# Patient Record
Sex: MALE | Race: WHITE | Employment: FULL TIME | URBAN - METROPOLITAN AREA
[De-identification: names, ages, dates, MRNs, and addresses within clinical notes are randomized per-mention and may not be internally consistent; named-entity substitution may affect disease eponyms.]

---

## 2021-02-20 ENCOUNTER — HOSPITAL ENCOUNTER (EMERGENCY)
Age: 66
Discharge: HOME | End: 2021-02-20
Attending: EMERGENCY MEDICINE
Payer: COMMERCIAL

## 2021-02-20 VITALS
HEIGHT: 73 IN | OXYGEN SATURATION: 95 % | HEART RATE: 74 BPM | RESPIRATION RATE: 16 BRPM | DIASTOLIC BLOOD PRESSURE: 72 MMHG | BODY MASS INDEX: 24.78 KG/M2 | WEIGHT: 186.95 LBS | SYSTOLIC BLOOD PRESSURE: 128 MMHG | TEMPERATURE: 97.2 F

## 2021-02-20 DIAGNOSIS — S61.213A LACERATION OF LEFT MIDDLE FINGER WITHOUT FOREIGN BODY WITHOUT DAMAGE TO NAIL, INITIAL ENCOUNTER: Primary | ICD-10-CM

## 2021-02-20 PROCEDURE — 75810000293 HC SIMP/SUPERF WND  RPR

## 2021-02-20 PROCEDURE — 74011000250 HC RX REV CODE- 250: Performed by: EMERGENCY MEDICINE

## 2021-02-20 PROCEDURE — 99282 EMERGENCY DEPT VISIT SF MDM: CPT

## 2021-02-20 RX ORDER — BACITRACIN 500 UNIT/G
1 PACKET (EA) TOPICAL
Status: COMPLETED | OUTPATIENT
Start: 2021-02-20 | End: 2021-02-20

## 2021-02-20 RX ORDER — ATORVASTATIN CALCIUM 10 MG/1
10 TABLET, FILM COATED ORAL DAILY
COMMUNITY

## 2021-02-20 RX ORDER — LIDOCAINE HYDROCHLORIDE 20 MG/ML
0.3 INJECTION, SOLUTION INFILTRATION; PERINEURAL ONCE
Status: COMPLETED | OUTPATIENT
Start: 2021-02-20 | End: 2021-02-20

## 2021-02-20 RX ADMIN — LIDOCAINE HYDROCHLORIDE 6 MG: 20 INJECTION, SOLUTION INFILTRATION; PERINEURAL at 12:42

## 2021-02-20 RX ADMIN — BACITRACIN 1 PACKET: 500 OINTMENT TOPICAL at 13:34

## 2021-02-20 NOTE — ED TRIAGE NOTES
Pt ambulates to treatment area he states that about an hour ago he sliced the inside of the left ring finger with a tape measure and the bleeding will not stop.   Pt's last tetanus was 6 yrs ago

## 2021-02-20 NOTE — DISCHARGE INSTRUCTIONS
Absorbable sutures placed. No removal necessary. Should resorb in 3 to 5 days. Keep wound clean and dry.   Monitor carefully for signs of infection (redness, swelling, warm to touch, pus draining from wound, severe pain.)

## 2021-02-20 NOTE — ED PROVIDER NOTES
The history is provided by the patient. Laceration   The incident occurred 1 to 2 hours ago. Pain location: left middle finger. The laceration is 2 cm in size. The injury mechanism is a metal edge. Foreign body present: no. The pain is mild. The patient's last tetanus shot was 5 to 10 years ago. Patient was using a tape measure when the edge sliced his left middle finger in the area of the middle phalanx, palmar surface. Patient states he was not initially aware that he had been cut until he saw blood on his pants. He reports having some trouble getting the bleeding under control. .    Past Medical History:   Diagnosis Date    Arthritis     High cholesterol        Past Surgical History:   Procedure Laterality Date    HX APPENDECTOMY           History reviewed. No pertinent family history.     Social History     Socioeconomic History    Marital status:      Spouse name: Not on file    Number of children: Not on file    Years of education: Not on file    Highest education level: Not on file   Occupational History    Not on file   Social Needs    Financial resource strain: Not on file    Food insecurity     Worry: Not on file     Inability: Not on file    Transportation needs     Medical: Not on file     Non-medical: Not on file   Tobacco Use    Smoking status: Former Smoker    Smokeless tobacco: Never Used   Substance and Sexual Activity    Alcohol use: Yes     Comment: weekends    Drug use: Never    Sexual activity: Not on file   Lifestyle    Physical activity     Days per week: Not on file     Minutes per session: Not on file    Stress: Not on file   Relationships    Social connections     Talks on phone: Not on file     Gets together: Not on file     Attends Yazidi service: Not on file     Active member of club or organization: Not on file     Attends meetings of clubs or organizations: Not on file     Relationship status: Not on file    Intimate partner violence     Fear of current or ex partner: Not on file     Emotionally abused: Not on file     Physically abused: Not on file     Forced sexual activity: Not on file   Other Topics Concern    Not on file   Social History Narrative    Not on file         ALLERGIES: Patient has no known allergies. Review of Systems   Skin: Positive for wound. Finger Laceration   All other systems reviewed and are negative. Vitals:    02/20/21 1218   BP: 128/72   Pulse: 74   Resp: 16   Temp: 97.2 °F (36.2 °C)   SpO2: 95%   Weight: 84.8 kg (186 lb 15.2 oz)   Height: 6' 1\" (1.854 m)            Physical Exam  Vitals signs and nursing note reviewed. Constitutional:       Appearance: Normal appearance. HENT:      Head: Normocephalic and atraumatic. Eyes:      Extraocular Movements: Extraocular movements intact. Neck:      Musculoskeletal: Normal range of motion. Pulmonary:      Effort: Pulmonary effort is normal.   Musculoskeletal: Normal range of motion. Hands:    Neurological:      General: No focal deficit present. Mental Status: He is alert and oriented to person, place, and time. Psychiatric:         Mood and Affect: Mood normal.         Behavior: Behavior normal.         Thought Content:  Thought content normal.         Judgment: Judgment normal.          2cm laceration  MDM  Number of Diagnoses or Management Options  Laceration of left middle finger without foreign body without damage to nail, initial encounter: new and does not require workup  Risk of Complications, Morbidity, and/or Mortality  Presenting problems: moderate  Diagnostic procedures: moderate  Management options: moderate    Patient Progress  Patient progress: stable         Wound Repair    Date/Time: 2/21/2021 7:04 AM  Performed by: attendingPreparation: skin prepped with Mor-Clens  Location details: left long finger  Wound length:2.5 cm or less  Anesthesia: local infiltration    Anesthesia:  Local Anesthetic: lidocaine 2% without epinephrine  Foreign bodies: no foreign bodies  Irrigation solution: saline  Debridement: minimal  Skin closure: Vicryl  Technique: running  Approximation: close  Dressing: antibiotic ointment, 4x4 and pressure dressing  My total time at bedside, performing this procedure was 16-30 minutes. 2cm laceration repair. 4-0 vicryl, running suture. No complications    LABORATORY TESTS: N/A    IMAGING RESULTS: N/A    MEDICATIONS GIVEN:  Medications   lidocaine (XYLOCAINE) 20 mg/mL (2 %) injection 6 mg (6 mg IntraDERMal Given by Provider 2/20/21 1922)   bacitracin 500 unit/gram packet 1 Packet (1 Packet Topical Given 2/20/21 8432)       IMPRESSION:  1. Laceration of left middle finger without foreign body without damage to nail, initial encounter        PLAN:  1. Discharge to Home  2.  Return to ED if worse

## 2021-02-20 NOTE — ED NOTES
Bacitracin, splint and bulky dressing applied to left ring finger pt tolerates well.   Pt given discharge instructions by Dr Audrey Pascual he verbalizes an understanding

## 2021-09-08 ENCOUNTER — TRANSCRIBE ORDER (OUTPATIENT)
Dept: SCHEDULING | Age: 66
End: 2021-09-08

## 2021-09-08 DIAGNOSIS — C61 PROSTATE CANCER (HCC): Primary | ICD-10-CM

## 2021-09-17 ENCOUNTER — HOSPITAL ENCOUNTER (OUTPATIENT)
Dept: RADIATION THERAPY | Age: 66
Discharge: HOME | End: 2021-09-17

## 2021-09-17 VITALS — BODY MASS INDEX: 23.79 KG/M2 | WEIGHT: 179.5 LBS | HEIGHT: 73 IN

## 2021-09-17 RX ORDER — GUAIFENESIN 100 MG/5ML
81 LIQUID (ML) ORAL DAILY
COMMUNITY

## 2021-11-12 NOTE — PERIOP NOTES
Informed patient of COVID requirements, patient to complete COVID curbside testing at [facility] complete by Wednesday, November 17th between 1179-2637. Patient verbalized understanding that COVID test is required to proceed with procedure.

## 2021-11-16 NOTE — H&P
73 y.o. male for open access colonoscopy for screening   Additional data for completion of the targeted pre-endoscopy H&P will be provided under 'H&P interval notes'. Please see that document which will be attached to this.   Carson Bahena MD

## 2021-11-17 ENCOUNTER — HOSPITAL ENCOUNTER (OUTPATIENT)
Dept: PREADMISSION TESTING | Age: 66
Discharge: HOME | End: 2021-11-17
Attending: SPECIALIST
Payer: COMMERCIAL

## 2021-11-17 ENCOUNTER — TRANSCRIBE ORDER (OUTPATIENT)
Dept: REGISTRATION | Age: 66
End: 2021-11-17

## 2021-11-17 DIAGNOSIS — Z01.812 PRE-PROCEDURE LAB EXAM: ICD-10-CM

## 2021-11-17 DIAGNOSIS — Z01.812 PRE-PROCEDURE LAB EXAM: Primary | ICD-10-CM

## 2021-11-17 PROCEDURE — U0005 INFEC AGEN DETEC AMPLI PROBE: HCPCS

## 2021-11-18 LAB
SARS-COV-2, XPLCVT: NOT DETECTED
SOURCE, COVRS: NORMAL

## 2021-11-18 RX ORDER — PREDNISONE 5 MG/1
5 TABLET ORAL DAILY
COMMUNITY

## 2021-11-18 RX ORDER — GLUCOSAMINE SULFATE 1500 MG
1000 POWDER IN PACKET (EA) ORAL DAILY
COMMUNITY

## 2021-11-18 RX ORDER — ABIRATERONE 500 MG/1
1000 TABLET ORAL
COMMUNITY

## 2021-11-18 NOTE — PROGRESS NOTES
[address]  Endoscopy Preprocedure Instructions      1. On the day of your surgery, please report to registration located on the 2nd floor of the  [facility]. yes    2. You must have a responsible adult to drive you to the hospital, stay at the hospital during your procedure and drive you home. If they leave your procedure will not be started (no exceptions). yes    3. Do not have anything to eat or drink (including water, gum, mints, coffee, and juice) after midnight. This does not apply to the medications you were instructed to take by your physician. yes  If you are currently taking Plavix, Coumadin, Aspirin, or other blood-thinning agents, contact your physician for special instructions. not applicable,    4. If you are having a procedure that requires bowel prep: We recommend that you have only clear liquids the day before your procedure and begin your bowel prep by 5:00 pm.  You may continue to drink clear liquids until midnight. If for any reason you are not able to complete your prep please notify your physician immediately. yes    5. Have a list of all current medications, including vitamins, herbal supplements and any other over the counter medications. yes    6. If you wear glasses, contacts, dentures and/or hearing aids, they may be removed prior to procedure, please bring a case to store them in. yes    7. You should understand that if you do not follow these instructions your procedure may be cancelled. If your physical condition changes (I.e. fever, cold or flu) please contact your doctor as soon as possible. 8. It is important that you be on time. If for any reason you are unable to keep your appointment please call [phone] the day of or your physicians office prior to your scheduled procedure    9.  Have you received your COVID Vaccine? yes If no, you will need to receive a COVID test/swab here at [facility] the MOB parking lot Monday - Friday 8a - 11am. There are no Saturday or Sunday swabbing at any [address] [place] [address] facility. (patient verbalizes understanding) yes

## 2021-11-19 ENCOUNTER — HOSPITAL ENCOUNTER (OUTPATIENT)
Age: 66
Setting detail: OUTPATIENT SURGERY
Discharge: HOME | End: 2021-11-19
Attending: SPECIALIST | Admitting: SPECIALIST
Payer: COMMERCIAL

## 2021-11-19 ENCOUNTER — ANESTHESIA (OUTPATIENT)
Dept: ENDOSCOPY | Age: 66
End: 2021-11-19
Payer: COMMERCIAL

## 2021-11-19 ENCOUNTER — ANESTHESIA EVENT (OUTPATIENT)
Dept: ENDOSCOPY | Age: 66
End: 2021-11-19
Payer: COMMERCIAL

## 2021-11-19 VITALS
BODY MASS INDEX: 23.92 KG/M2 | WEIGHT: 176.59 LBS | TEMPERATURE: 97.6 F | HEART RATE: 53 BPM | RESPIRATION RATE: 16 BRPM | DIASTOLIC BLOOD PRESSURE: 75 MMHG | SYSTOLIC BLOOD PRESSURE: 116 MMHG | HEIGHT: 72 IN | OXYGEN SATURATION: 98 %

## 2021-11-19 PROCEDURE — 76060000031 HC ANESTHESIA FIRST 0.5 HR: Performed by: SPECIALIST

## 2021-11-19 PROCEDURE — 74011250636 HC RX REV CODE- 250/636: Performed by: NURSE ANESTHETIST, CERTIFIED REGISTERED

## 2021-11-19 PROCEDURE — 2709999900 HC NON-CHARGEABLE SUPPLY: Performed by: SPECIALIST

## 2021-11-19 PROCEDURE — 76040000019: Performed by: SPECIALIST

## 2021-11-19 PROCEDURE — 77030013992 HC SNR POLYP ENDOSC BSC -B: Performed by: SPECIALIST

## 2021-11-19 PROCEDURE — 88305 TISSUE EXAM BY PATHOLOGIST: CPT

## 2021-11-19 RX ORDER — DEXTROMETHORPHAN/PSEUDOEPHED 2.5-7.5/.8
1.2 DROPS ORAL
Status: DISCONTINUED | OUTPATIENT
Start: 2021-11-19 | End: 2021-11-19 | Stop reason: HOSPADM

## 2021-11-19 RX ORDER — FLUMAZENIL 0.1 MG/ML
0.2 INJECTION INTRAVENOUS
Status: DISCONTINUED | OUTPATIENT
Start: 2021-11-19 | End: 2021-11-19 | Stop reason: HOSPADM

## 2021-11-19 RX ORDER — PROPOFOL 10 MG/ML
INJECTION, EMULSION INTRAVENOUS AS NEEDED
Status: DISCONTINUED | OUTPATIENT
Start: 2021-11-19 | End: 2021-11-19 | Stop reason: HOSPADM

## 2021-11-19 RX ORDER — SODIUM CHLORIDE 9 MG/ML
INJECTION, SOLUTION INTRAVENOUS
Status: DISCONTINUED | OUTPATIENT
Start: 2021-11-19 | End: 2021-11-19 | Stop reason: HOSPADM

## 2021-11-19 RX ORDER — PROPOFOL 10 MG/ML
INJECTION, EMULSION INTRAVENOUS
Status: DISCONTINUED | OUTPATIENT
Start: 2021-11-19 | End: 2021-11-19 | Stop reason: HOSPADM

## 2021-11-19 RX ORDER — NALOXONE HYDROCHLORIDE 0.4 MG/ML
0.4 INJECTION, SOLUTION INTRAMUSCULAR; INTRAVENOUS; SUBCUTANEOUS
Status: DISCONTINUED | OUTPATIENT
Start: 2021-11-19 | End: 2021-11-19 | Stop reason: HOSPADM

## 2021-11-19 RX ORDER — MIDAZOLAM HYDROCHLORIDE 1 MG/ML
.25-5 INJECTION, SOLUTION INTRAMUSCULAR; INTRAVENOUS AS NEEDED
Status: DISCONTINUED | OUTPATIENT
Start: 2021-11-19 | End: 2021-11-19 | Stop reason: HOSPADM

## 2021-11-19 RX ORDER — SODIUM CHLORIDE 9 MG/ML
50 INJECTION, SOLUTION INTRAVENOUS CONTINUOUS
Status: DISCONTINUED | OUTPATIENT
Start: 2021-11-19 | End: 2021-11-19 | Stop reason: HOSPADM

## 2021-11-19 RX ORDER — FENTANYL CITRATE 50 UG/ML
25 INJECTION, SOLUTION INTRAMUSCULAR; INTRAVENOUS AS NEEDED
Status: DISCONTINUED | OUTPATIENT
Start: 2021-11-19 | End: 2021-11-19 | Stop reason: HOSPADM

## 2021-11-19 RX ADMIN — SODIUM CHLORIDE: 9 INJECTION, SOLUTION INTRAVENOUS at 07:55

## 2021-11-19 RX ADMIN — PROPOFOL INJECTABLE EMULSION 140 MCG/KG/MIN: 10 INJECTION, EMULSION INTRAVENOUS at 08:15

## 2021-11-19 RX ADMIN — PROPOFOL 80 MG: 10 INJECTION, EMULSION INTRAVENOUS at 08:15

## 2021-11-19 RX ADMIN — PROPOFOL 40 MG: 10 INJECTION, EMULSION INTRAVENOUS at 08:21

## 2021-11-19 NOTE — DISCHARGE INSTRUCTIONS
[address] [facility] WALDEMAR Rubio MD  [phone]      November 19, 2021    Vincent Abraham  YOB: 1955    COLONOSCOPY DISCHARGE INSTRUCTIONS    If there is redness at IV site you should apply warm compress to area. If redness or soreness persist contact Dr. Didi Rubio' or your primary care doctor. There may be a slight amount of blood passed from the rectum. Gaseous discomfort may develop, but walking, belching will help relieve this. You may not operate a vehicle for 12 hours  You may not operate machinery or dangerous appliances for rest of today  You may not drink alcoholic beverages for 12 hours  Avoid making any critical decisions for 24 hours    DIET:  You may resume your normal diet, but some patients find that heavy or large meals may lead to indigestion or vomiting. I suggest a light meal as first food intake. MEDICATIONS:  The use of some over-the-counter pain medication may lead to bleeding after colon biopsies or polyp removal.  Tylenol (also called acetaminophen) is safe to take even if you have had colonoscopy with polyp removal.  Based on the procedure you had today you may not safely take aspirin or aspirin-like products for the next ten (10) days. Remember that Tylenol (also called acetaminophen) is safe to take after colonoscopy even if you have had biopsies or polyps removed. ACTIVITY:  You may resume your normal household activities, but it is recommended that you spend the remainder of the day resting -  avoid any strenuous activity. CALL DR. Lala Mccormick' OFFICE IF:  Increasing pain, nausea, vomiting  Abdominal distension (swelling)  Significant new or increased bleeding (oral or rectal)  Fever/Chills  Chest pain/shortness of breath                       Additional instructions: We found and removed one small polyp today; I'll contact you with those results by letter in about a week.      It was an honor to be your doctor today. Please let me or my office staff know if you have any feedback about today's procedure. Zach Jara MD    Colonoscopy saves lives, and can prevent colon cancer. Everyone aged 48 or older needs colonoscopy.   Tell your family and friends: get the test!

## 2021-11-19 NOTE — PROGRESS NOTES
Endoscopy discharge instructions have been reviewed and given to patient. The patient verbalized understanding and acceptance of instructions. Dr. Girish Mann discussed with patient procedure findings and next steps.

## 2021-11-19 NOTE — ANESTHESIA PREPROCEDURE EVALUATION
Relevant Problems   No relevant active problems       Anesthetic History   No history of anesthetic complications            Review of Systems / Medical History  Patient summary reviewed and pertinent labs reviewed    Pulmonary  Within defined limits                 Neuro/Psych   Within defined limits           Cardiovascular              Hyperlipidemia    Exercise tolerance: >4 METS     GI/Hepatic/Renal  Within defined limits              Endo/Other        Arthritis and cancer (metastatic prosate cancer recently diagnosed)     Other Findings              Physical Exam    Airway  Mallampati: II  TM Distance: 4 - 6 cm  Neck ROM: normal range of motion   Mouth opening: Normal     Cardiovascular    Rhythm: regular  Rate: normal         Dental    Dentition: Upper dentition intact and Lower dentition intact     Pulmonary  Breath sounds clear to auscultation               Abdominal         Other Findings            Anesthetic Plan    ASA: 3  Anesthesia type: MAC          Induction: Intravenous  Anesthetic plan and risks discussed with: Patient

## 2021-11-19 NOTE — PROGRESS NOTES
Ag Rea  1955  [id number]    Situation:  Verbal report received from:  Lynette Armstrong RN   Procedure: Procedure(s):  COLONOSCOPY  ENDOSCOPIC POLYPECTOMY    Background:    Preoperative diagnosis: Screening for malignant neoplasm of colon [Z12.11]  Postoperative diagnosis: 1.- Colonic Polyp  2.- Hemorrhoids    :  Dr. Gema Hernandez   Assistant(s): Endoscopy Technician-1: Gena Clements  Endoscopy RN-1: Ashanti Ruano RN    Specimens:   ID Type Source Tests Collected by Time Destination   1 : Ascending Colon Polyp Preservative   Violetta Brown MD 11/19/2021 9488 Pathology     H. Pylori  no    Assessment:  Intra-procedure medications     Anesthesia gave intra-procedure sedation and medications, see anesthesia flow sheet yes    Intravenous fluids: NS@ KVO     Vital signs stable  yes    Abdominal assessment: round and soft   yes    Recommendation:  Discharge patient per MD order  yes.   Return to floor outpatient  Family or Friend  Spouse   Permission to share finding with family or friend yes

## 2021-11-19 NOTE — INTERVAL H&P NOTE
Pre-Endoscopy H&P Update  Chief complaint/HPI/ROS:  The indication for the procedure, the patient's history and the patient's current medications are reviewed prior to the procedure and that data is reported on the H&P to which this document is attached. Any significant complaints with regard to organ systems will be noted, and if not mentioned then a review of systems is not contributory. Past Medical History:   Diagnosis Date    Arthritis     Cancer ([facility] [address] 06/2021    metistatic prostate cancer, mets to spine    High cholesterol       Past Surgical History:   Procedure Laterality Date    HX APPENDECTOMY       Social   Social History     Tobacco Use    Smoking status: Former Smoker    Smokeless tobacco: Never Used   Substance Use Topics    Alcohol use: Yes     Comment: weekends      History reviewed. No pertinent family history. No Known Allergies   Prior to Admission Medications   Prescriptions Last Dose Informant Patient Reported? Taking?   abiraterone 500 mg tab 11/18/2021 at Unknown time  Yes Yes   Sig: Take 1,000 mg by mouth. aspirin 81 mg chewable tablet Not Taking at Unknown time  Yes No   Sig: Take 81 mg by mouth daily. Patient not taking: Reported on 11/18/2021   atorvastatin (Lipitor) 10 mg tablet 11/18/2021 at Unknown time  Yes Yes   Sig: Take 10 mg by mouth daily. calcium carbonate (CALCIUM 600 PO) 11/18/2021 at Unknown time  Yes Yes   Sig: Take 600 mg by mouth. cholecalciferol (Vitamin D3) 25 mcg (1,000 unit) cap 11/18/2021 at Unknown time  Yes Yes   Sig: Take 1,000 Units by mouth daily. predniSONE (DELTASONE) 5 mg tablet 11/18/2021 at Unknown time  Yes Yes   Sig: Take 5 mg by mouth daily. Facility-Administered Medications: None       PHYSICAL EXAM:  The patient is examined immediately prior to the procedure.   Visit Vitals  BP (!) 146/76   Pulse (!) 54   Temp 97.9 °F (36.6 °C)   Resp 15   Ht 6' (1.829 m)   Wt 80.1 kg (176 lb 9.4 oz)   SpO2 99%   BMI 23.95 kg/m²     Gen: Appears comfortable, no distress. Pulm: comfortable respirations with no abnormal audible breath sounds  HEART: well perfused, no abnormal audible heart sounds  GI: abdomen flat. PLAN:  Informed consent discussion held, patient afforded an opportunity to ask questions and all questions answered. After being advised of the risks, benefits, and alternatives, the patient requested that we proceed and indicated so on a written consent form. Will proceed with procedure as planned.   Jerica Burgos MD

## 2021-11-19 NOTE — PROGRESS NOTES

## 2021-11-19 NOTE — PROCEDURES
[address] [facility] WALDEMAR Dhaliwal MD  [phone]      2021    Colonoscopy Procedure Note  Mango Li  :  1955  [place] Medical Record Number: [id number]    Indications:     Screening colonoscopy  PCP:  Gia Hodges MD  Anesthesia/Sedation: Conscious Sedation/Moderate Sedation/GETA, see notes  Endoscopist:  Dr. Nigel Woodard  Complications:  None  Estimated Blood Loss:  None    Permit:  The indications, risks, benefits and alternatives were reviewed with the patient or their decision maker who was provided an opportunity to ask questions and all questions were answered. The specific risks of colonoscopy with conscious sedation were reviewed, including but not limited to anesthetic complication, bleeding, adverse drug reaction, missed lesion, infection, IV site reactions, and intestinal perforation which would lead to the need for surgical repair. Alternatives to colonoscopy including radiographic imaging, observation without testing, or laboratory testing were reviewed including the limitations of those alternatives. After considering the options and having all their questions answered, the patient or their decision maker provided both verbal and written consent to proceed. Procedure in Detail:  After obtaining informed consent, positioning of the patient in the left lateral decubitus position, and conduction of a pre-procedure pause or \"time out\" the endoscope was introduced into the anus and advanced to the cecum, which was identified by the ileocecal valve and appendiceal orifice. The quality of the colonic preparation was good. A careful inspection was made as the colonoscope was withdrawn, findings and interventions are described below. Findings:   5mm polyp ascending removed cold snare, retrieved, hemostasis confirmed.   There is diverticulosis in the sigmoid colon without complications such as bleeding, inflammatory change, or luminal narrowing. Specimens:    See above    Complications:   None; patient tolerated the procedure well. Impression:  Colon polyp, hemorrhoids    Recommendations:     - Await pathology. Thank you for entrusting me with this patient's care. Please do not hesitate to contact me with any questions or if I can be of assistance with any of your other patients' GI needs. Signed By: Hellen Mcnair MD                        November 19, 2021      Surgical assistant none. Implants none unless specified.

## 2023-05-04 ENCOUNTER — TRANSCRIBE ORDER (OUTPATIENT)
Dept: SCHEDULING | Age: 68
End: 2023-05-04

## 2023-05-04 DIAGNOSIS — M17.0 PRIMARY OSTEOARTHRITIS OF BOTH KNEES: Primary | ICD-10-CM

## 2023-05-10 ENCOUNTER — HOSPITAL ENCOUNTER (OUTPATIENT)
Facility: HOSPITAL | Age: 68
Discharge: HOME | End: 2023-05-13
Payer: MEDICARE

## 2023-05-10 DIAGNOSIS — M17.0 PRIMARY OSTEOARTHRITIS OF BOTH KNEES: ICD-10-CM

## 2023-05-10 PROCEDURE — 73700 CT LOWER EXTREMITY W/O DYE: CPT

## 2023-05-10 RX ORDER — ASPIRIN 81 MG/1
81 TABLET, CHEWABLE ORAL DAILY
Status: ON HOLD | COMMUNITY
End: 2023-05-18 | Stop reason: HOSPADM

## 2023-05-10 RX ORDER — ABIRATERONE ACETATE 250 MG/1
750 TABLET ORAL EVERY MORNING
COMMUNITY

## 2023-05-10 RX ORDER — ATORVASTATIN CALCIUM 20 MG/1
20 TABLET, FILM COATED ORAL
COMMUNITY

## 2023-05-10 RX ORDER — PREDNISONE 5 MG/1
5 TABLET ORAL EVERY MORNING
COMMUNITY

## 2023-05-11 ENCOUNTER — HOSPITAL ENCOUNTER (OUTPATIENT)
Facility: HOSPITAL | Age: 68
Discharge: HOME | End: 2023-05-11
Payer: MEDICARE

## 2023-05-11 VITALS
RESPIRATION RATE: 18 BRPM | SYSTOLIC BLOOD PRESSURE: 150 MMHG | BODY MASS INDEX: 23.84 KG/M2 | HEART RATE: 60 BPM | OXYGEN SATURATION: 98 % | TEMPERATURE: 97.1 F | HEIGHT: 72 IN | WEIGHT: 176 LBS | DIASTOLIC BLOOD PRESSURE: 88 MMHG

## 2023-05-11 LAB
ALBUMIN SERPL-MCNC: 3.7 G/DL (ref 3.5–5)
ALBUMIN/GLOB SERPL: 1.2 (ref 1.1–2.2)
ALP SERPL-CCNC: 80 U/L (ref 45–117)
ALT SERPL-CCNC: 31 U/L (ref 12–78)
ANION GAP SERPL CALC-SCNC: 2 MMOL/L (ref 5–15)
APPEARANCE UR: CLEAR
AST SERPL-CCNC: 16 U/L (ref 15–37)
BACTERIA URNS QL MICRO: NEGATIVE /HPF
BASOPHILS # BLD: 0.1 K/UL (ref 0–0.1)
BASOPHILS NFR BLD: 1 % (ref 0–1)
BILIRUB SERPL-MCNC: 0.8 MG/DL (ref 0.2–1)
BILIRUB UR QL: NEGATIVE
BUN SERPL-MCNC: 21 MG/DL (ref 6–20)
BUN/CREAT SERPL: 29 (ref 12–20)
CALCIUM SERPL-MCNC: 8.9 MG/DL (ref 8.5–10.1)
CHLORIDE SERPL-SCNC: 108 MMOL/L (ref 97–108)
CO2 SERPL-SCNC: 28 MMOL/L (ref 21–32)
COLOR UR: NORMAL
CREAT SERPL-MCNC: 0.73 MG/DL (ref 0.7–1.3)
CRP SERPL-MCNC: <0.29 MG/DL (ref 0–0.6)
DIFFERENTIAL METHOD BLD: ABNORMAL
EOSINOPHIL # BLD: 0.1 K/UL (ref 0–0.4)
EOSINOPHIL NFR BLD: 1 % (ref 0–7)
EPITH CASTS URNS QL MICRO: NORMAL /LPF
ERYTHROCYTE [DISTWIDTH] IN BLOOD BY AUTOMATED COUNT: 12.1 % (ref 11.5–14.5)
ERYTHROCYTE [SEDIMENTATION RATE] IN BLOOD: 7 MM/HR (ref 0–20)
EST. AVERAGE GLUCOSE BLD GHB EST-MCNC: 114 MG/DL
GLOBULIN SER CALC-MCNC: 3 G/DL (ref 2–4)
GLUCOSE SERPL-MCNC: 88 MG/DL (ref 65–100)
GLUCOSE UR STRIP.AUTO-MCNC: NEGATIVE MG/DL
HBA1C MFR BLD: 5.6 % (ref 4–5.6)
HCT VFR BLD AUTO: 42.1 % (ref 36.6–50.3)
HGB BLD-MCNC: 13.8 G/DL (ref 12.1–17)
HGB UR QL STRIP: NEGATIVE
HYALINE CASTS URNS QL MICRO: NORMAL /LPF (ref 0–2)
IMM GRANULOCYTES # BLD AUTO: 0 K/UL (ref 0–0.04)
IMM GRANULOCYTES NFR BLD AUTO: 0 % (ref 0–0.5)
KETONES UR QL STRIP.AUTO: NEGATIVE MG/DL
LEUKOCYTE ESTERASE UR QL STRIP.AUTO: NEGATIVE
LYMPHOCYTES # BLD: 0.6 K/UL (ref 0.8–3.5)
LYMPHOCYTES NFR BLD: 9 % (ref 12–49)
MCH RBC QN AUTO: 31.1 PG (ref 26–34)
MCHC RBC AUTO-ENTMCNC: 32.8 G/DL (ref 30–36.5)
MCV RBC AUTO: 94.8 FL (ref 80–99)
MONOCYTES # BLD: 0.4 K/UL (ref 0–1)
MONOCYTES NFR BLD: 6 % (ref 5–13)
NEUTS SEG # BLD: 5 K/UL (ref 1.8–8)
NEUTS SEG NFR BLD: 83 % (ref 32–75)
NITRITE UR QL STRIP.AUTO: NEGATIVE
NRBC # BLD: 0 K/UL (ref 0–0.01)
NRBC BLD-RTO: 0 PER 100 WBC
PH UR STRIP: 5.5 (ref 5–8)
PLATELET # BLD AUTO: 225 K/UL (ref 150–400)
PMV BLD AUTO: 10.3 FL (ref 8.9–12.9)
POTASSIUM SERPL-SCNC: 4.1 MMOL/L (ref 3.5–5.1)
PROT SERPL-MCNC: 6.7 G/DL (ref 6.4–8.2)
PROT UR STRIP-MCNC: NEGATIVE MG/DL
RBC # BLD AUTO: 4.44 M/UL (ref 4.1–5.7)
RBC #/AREA URNS HPF: NORMAL /HPF (ref 0–5)
RBC MORPH BLD: ABNORMAL
SODIUM SERPL-SCNC: 138 MMOL/L (ref 136–145)
SP GR UR REFRACTOMETRY: 1.02 (ref 1–1.03)
URINE CULTURE IF INDICATED: NORMAL
UROBILINOGEN UR QL STRIP.AUTO: 0.2 EU/DL (ref 0.2–1)
WBC # BLD AUTO: 6.2 K/UL (ref 4.1–11.1)
WBC URNS QL MICRO: NORMAL /HPF (ref 0–4)

## 2023-05-11 PROCEDURE — 81001 URINALYSIS AUTO W/SCOPE: CPT

## 2023-05-11 PROCEDURE — 36415 COLL VENOUS BLD VENIPUNCTURE: CPT

## 2023-05-11 PROCEDURE — 84466 ASSAY OF TRANSFERRIN: CPT

## 2023-05-11 PROCEDURE — 80053 COMPREHEN METABOLIC PANEL: CPT

## 2023-05-11 PROCEDURE — 85652 RBC SED RATE AUTOMATED: CPT

## 2023-05-11 PROCEDURE — 85025 COMPLETE CBC W/AUTO DIFF WBC: CPT

## 2023-05-11 PROCEDURE — 86140 C-REACTIVE PROTEIN: CPT

## 2023-05-11 PROCEDURE — 83036 HEMOGLOBIN GLYCOSYLATED A1C: CPT

## 2023-05-11 ASSESSMENT — ENCOUNTER SYMPTOMS
SHORTNESS OF BREATH: 0
VOMITING: 0
SORE THROAT: 0
BLOOD IN STOOL: 0
COUGH: 0
TROUBLE SWALLOWING: 0
ABDOMINAL PAIN: 0
WHEEZING: 0
NAUSEA: 0

## 2023-05-11 ASSESSMENT — PAIN SCALES - GENERAL: PAINLEVEL_OUTOF10: 6

## 2023-05-11 ASSESSMENT — PAIN DESCRIPTION - ORIENTATION: ORIENTATION: RIGHT

## 2023-05-11 ASSESSMENT — PAIN DESCRIPTION - LOCATION: LOCATION: KNEE

## 2023-05-11 NOTE — H&P
Abdirizak Camejo was referred for evaluation by:Dr. Mortimer  for Pre- Op Evaluation. Please see encounter details and orders for consultative summary. Type of surgery : Left Total Knee Arthroplasty  Surgery site : Left knee  Anesthesia type: Spinal  Date of procedure:  5/18/2023    This 78y.o. year old male presents with complaints of left knee pain for the past several years. Has had arthroscopic surgery to both knees 10+ years ago that gave him significant relief. Conservative measures including medication management, joint injection and physical therapy have been exhausted prior to the decision for surgery. Patient has discussed the risks, alternatives, and benefits of the surgery with surgeon and has elected to proceed with surgical intervention. History of Prostate cancer with mets to spine. He is managed by Oncologist at [facility] Dr Merlene Davis. Reports clearance form was sent to Oncologist from Dr Keri Barnes office for upcoming surgery. Allergies: No Known Allergies  Latex allergy: no  Prior reactions to anesthesia:  None     Current Outpatient Medications   Medication Sig    Leuprolide Acetate, 3 Month, (ELIGARD) 22.5 MG KIT Inject 22.5 mg into the skin every 3 months    Glucosamine-Chondroitin-MSM (GLUCOSAMINE CHONDROIT MSM DS PO) Take 2 tablets by mouth every morning 1500mg-1200mg    Calcium-Magnesium-Vitamin D (CITRACAL CALCIUM+D PO) Take 2 tablets by mouth in the morning and at bedtime 650 mg    abiraterone acetate (ZYTIGA) 250 MG tablet Take 3 tablets by mouth every morning    aspirin 81 MG chewable tablet Take 1 tablet by mouth daily    atorvastatin (LIPITOR) 20 MG tablet Take 1 tablet by mouth nightly    Cholecalciferol 50 MCG (2000 UT) TABS Take 2 tablets by mouth every morning    predniSONE (DELTASONE) 5 MG tablet Take 1 tablet by mouth every morning     No current facility-administered medications for this encounter.      Past Medical History:   Diagnosis Date    Arthritis     Cancer ([address]

## 2023-05-12 LAB
BACTERIA SPEC CULT: NORMAL
BACTERIA SPEC CULT: NORMAL
SERVICE CMNT-IMP: NORMAL
TRANSFERRIN SERPL-MCNC: 248 MG/DL (ref 177–329)

## 2023-05-12 NOTE — PERIOP NOTE
Patient instructed to take home blood pressures today, this evening and tomorrow AM and call PAT at [phone] with results per Filipe Camarena NP.
Spoke with patient and documented home BP readings  over last 2 days 111/62, 112/63/, 125/66. Patient had questions about lab results discussed his concerns and instructed that Francisco Brock NP would look at pending labs and would notify him if there was any concern.
morning of surgery, please do not apply anything to your skin (lotions, powders, deodorant, or makeup, especially mascara)  Follow Chlorhexidine Care Fusion body wash instructions provided to you during PAT appointment. Begin 3 days prior to surgery. Do not shave or trim anywhere 24 hours before surgery  Wear your hair loose or down; no pony-tails, buns, or metal hair clips  Wear loose, comfortable, clean clothes  Wear glasses instead of contacts. Bring a case to keep your glasses safe. Leave money, valuables, and jewelry, including body piercings, at home  If you were given an IGIGI, bring it on day of surgery. Going Home - or Spending the Night SAME-DAY SURGERY: You must have a responsible adult drive you home and stay with you 24 hours after surgery. You may not drive for 24 hours after surgery. ADMITS: If your doctor is keeping you in the hospital after surgery, leave personal belongings/luggage in your car until you have a hospital room number. Hospital discharge time is 12 noon  Drivers must be here before 12 noon unless you are told differently   Special Instructions Free  parking available from 7 PM until 5 PM.       Follow all instructions so your surgery wont be cancelled. Please, be on time. If a situation occurs and you are delayed the day of surgery, call [phone]. If your physical condition changes (like a fever, cold, flu, etc.) call your surgeon. Home medication(s) reviewed and verified via LIST and VERBAL   during PAT appointment. The patient was contacted  IN-PERSON. The patient verbalizes understanding of all instructions and DOES NOT need reinforcement.

## 2023-05-13 LAB
EKG ATRIAL RATE: 57 BPM
EKG DIAGNOSIS: NORMAL
EKG P AXIS: 62 DEGREES
EKG P-R INTERVAL: 102 MS
EKG Q-T INTERVAL: 444 MS
EKG QRS DURATION: 78 MS
EKG QTC CALCULATION (BAZETT): 432 MS
EKG R AXIS: 53 DEGREES
EKG T AXIS: 52 DEGREES
EKG VENTRICULAR RATE: 57 BPM

## 2023-05-17 ENCOUNTER — ANESTHESIA EVENT (OUTPATIENT)
Facility: HOSPITAL | Age: 68
End: 2023-05-17
Payer: MEDICARE

## 2023-05-17 RX ORDER — ASPIRIN 81 MG/1
81 TABLET ORAL 2 TIMES DAILY
Status: CANCELLED | OUTPATIENT
Start: 2023-05-17

## 2023-05-17 NOTE — PROGRESS NOTES
The patient was provided a virtual link to view the pre-operative Joint Replacement Class Video  A Patient Education Book specific to total hip or knee joint replacement surgery was given to the patient in [facility]. The content of the class was presented using an audio power point presentation specific for patients undergoing total hip and knee replacement surgery. Incentive spirometer and CHG bath kits were verbally reviewed. Day of surgery routine and expectations, hospital routine and expectations, nutrition, alcohol, nicotine, medications, infection control, pain management, DVT precautions and equipment, ice therapy, durable medical equipment, exercises, mobility expectations and precautions, home preparation and safety were reviewed in class video. My contact information was shared with the patient to provide further information as requested by the patient related to their upcoming surgery. Received confirmation that the patient viewed joint class online via the [employer] pre-op education video survey.

## 2023-05-18 ENCOUNTER — APPOINTMENT (OUTPATIENT)
Facility: HOSPITAL | Age: 68
End: 2023-05-18
Attending: ORTHOPAEDIC SURGERY
Payer: MEDICARE

## 2023-05-18 ENCOUNTER — ANESTHESIA (OUTPATIENT)
Facility: HOSPITAL | Age: 68
End: 2023-05-18
Payer: MEDICARE

## 2023-05-18 ENCOUNTER — HOSPITAL ENCOUNTER (OUTPATIENT)
Facility: HOSPITAL | Age: 68
Setting detail: OBSERVATION
Discharge: HOME | End: 2023-05-19
Attending: ORTHOPAEDIC SURGERY | Admitting: ORTHOPAEDIC SURGERY
Payer: MEDICARE

## 2023-05-18 DIAGNOSIS — M17.12 ARTHRITIS OF LEFT KNEE: Primary | ICD-10-CM

## 2023-05-18 PROCEDURE — 3700000000 HC ANESTHESIA ATTENDED CARE: Performed by: ORTHOPAEDIC SURGERY

## 2023-05-18 PROCEDURE — 7100000001 HC PACU RECOVERY - ADDTL 15 MIN: Performed by: ORTHOPAEDIC SURGERY

## 2023-05-18 PROCEDURE — 6370000000 HC RX 637 (ALT 250 FOR IP): Performed by: PHYSICIAN ASSISTANT

## 2023-05-18 PROCEDURE — 2580000003 HC RX 258: Performed by: ORTHOPAEDIC SURGERY

## 2023-05-18 PROCEDURE — 2580000003 HC RX 258: Performed by: PHYSICIAN ASSISTANT

## 2023-05-18 PROCEDURE — 97161 PT EVAL LOW COMPLEX 20 MIN: CPT

## 2023-05-18 PROCEDURE — 3600000015 HC SURGERY LEVEL 5 ADDTL 15MIN: Performed by: ORTHOPAEDIC SURGERY

## 2023-05-18 PROCEDURE — 2580000003 HC RX 258: Performed by: ANESTHESIOLOGY

## 2023-05-18 PROCEDURE — 6370000000 HC RX 637 (ALT 250 FOR IP): Performed by: ANESTHESIOLOGY

## 2023-05-18 PROCEDURE — 6360000002 HC RX W HCPCS: Performed by: ANESTHESIOLOGY

## 2023-05-18 PROCEDURE — 6360000002 HC RX W HCPCS: Performed by: NURSE ANESTHETIST, CERTIFIED REGISTERED

## 2023-05-18 PROCEDURE — 6360000002 HC RX W HCPCS: Performed by: ORTHOPAEDIC SURGERY

## 2023-05-18 PROCEDURE — 6370000000 HC RX 637 (ALT 250 FOR IP): Performed by: ORTHOPAEDIC SURGERY

## 2023-05-18 PROCEDURE — 73560 X-RAY EXAM OF KNEE 1 OR 2: CPT

## 2023-05-18 PROCEDURE — G0378 HOSPITAL OBSERVATION PER HR: HCPCS

## 2023-05-18 PROCEDURE — 64447 NJX AA&/STRD FEMORAL NRV IMG: CPT | Performed by: ANESTHESIOLOGY

## 2023-05-18 PROCEDURE — 2720000010 HC SURG SUPPLY STERILE: Performed by: ORTHOPAEDIC SURGERY

## 2023-05-18 PROCEDURE — 97116 GAIT TRAINING THERAPY: CPT

## 2023-05-18 PROCEDURE — 3700000001 HC ADD 15 MINUTES (ANESTHESIA): Performed by: ORTHOPAEDIC SURGERY

## 2023-05-18 PROCEDURE — 2500000003 HC RX 250 WO HCPCS: Performed by: NURSE ANESTHETIST, CERTIFIED REGISTERED

## 2023-05-18 PROCEDURE — 7100000000 HC PACU RECOVERY - FIRST 15 MIN: Performed by: ORTHOPAEDIC SURGERY

## 2023-05-18 PROCEDURE — C1776 JOINT DEVICE (IMPLANTABLE): HCPCS | Performed by: ORTHOPAEDIC SURGERY

## 2023-05-18 PROCEDURE — 2709999900 HC NON-CHARGEABLE SUPPLY: Performed by: ORTHOPAEDIC SURGERY

## 2023-05-18 PROCEDURE — 6360000002 HC RX W HCPCS: Performed by: PHYSICIAN ASSISTANT

## 2023-05-18 PROCEDURE — 3600000005 HC SURGERY LEVEL 5 BASE: Performed by: ORTHOPAEDIC SURGERY

## 2023-05-18 DEVICE — CRUCIATE RETAINING FEMORAL
Type: IMPLANTABLE DEVICE | Site: KNEE | Status: FUNCTIONAL
Brand: TRIATHLON

## 2023-05-18 DEVICE — IMPLANTABLE DEVICE: Type: IMPLANTABLE DEVICE | Site: KNEE | Status: FUNCTIONAL

## 2023-05-18 DEVICE — PATELLA
Type: IMPLANTABLE DEVICE | Site: KNEE | Status: FUNCTIONAL
Brand: TRIATHLON

## 2023-05-18 DEVICE — TIBIAL COMPONENT
Type: IMPLANTABLE DEVICE | Site: KNEE | Status: FUNCTIONAL
Brand: TRIATHLON

## 2023-05-18 DEVICE — COMPONENT TOT KNEE CAPPED PRIMARY K2STRYKER] STRYKER CORP]: Type: IMPLANTABLE DEVICE | Status: FUNCTIONAL

## 2023-05-18 RX ORDER — PREDNISONE 5 MG/1
5 TABLET ORAL EVERY MORNING
Status: DISCONTINUED | OUTPATIENT
Start: 2023-05-19 | End: 2023-05-19 | Stop reason: HOSPADM

## 2023-05-18 RX ORDER — ATORVASTATIN CALCIUM 20 MG/1
20 TABLET, FILM COATED ORAL
Status: DISCONTINUED | OUTPATIENT
Start: 2023-05-18 | End: 2023-05-19 | Stop reason: HOSPADM

## 2023-05-18 RX ORDER — SODIUM CHLORIDE 0.9 % (FLUSH) 0.9 %
5-40 SYRINGE (ML) INJECTION EVERY 12 HOURS SCHEDULED
Status: DISCONTINUED | OUTPATIENT
Start: 2023-05-18 | End: 2023-05-19 | Stop reason: HOSPADM

## 2023-05-18 RX ORDER — PREGABALIN 75 MG/1
75 CAPSULE ORAL ONCE
Status: COMPLETED | OUTPATIENT
Start: 2023-05-18 | End: 2023-05-18

## 2023-05-18 RX ORDER — ABIRATERONE ACETATE 250 MG/1
750 TABLET ORAL EVERY MORNING
Status: DISCONTINUED | OUTPATIENT
Start: 2023-05-19 | End: 2023-05-19 | Stop reason: HOSPADM

## 2023-05-18 RX ORDER — LIDOCAINE HYDROCHLORIDE 20 MG/ML
INJECTION, SOLUTION EPIDURAL; INFILTRATION; INTRACAUDAL; PERINEURAL PRN
Status: DISCONTINUED | OUTPATIENT
Start: 2023-05-18 | End: 2023-05-18 | Stop reason: SDUPTHER

## 2023-05-18 RX ORDER — IBUPROFEN 800 MG/1
800 TABLET ORAL EVERY 6 HOURS
Qty: 60 TABLET | Refills: 0 | Status: SHIPPED | OUTPATIENT
Start: 2023-05-18

## 2023-05-18 RX ORDER — ONDANSETRON 2 MG/ML
4 INJECTION INTRAMUSCULAR; INTRAVENOUS
Status: DISCONTINUED | OUTPATIENT
Start: 2023-05-18 | End: 2023-05-18 | Stop reason: HOSPADM

## 2023-05-18 RX ORDER — SODIUM CHLORIDE, SODIUM LACTATE, POTASSIUM CHLORIDE, CALCIUM CHLORIDE 600; 310; 30; 20 MG/100ML; MG/100ML; MG/100ML; MG/100ML
INJECTION, SOLUTION INTRAVENOUS CONTINUOUS
Status: DISCONTINUED | OUTPATIENT
Start: 2023-05-18 | End: 2023-05-18 | Stop reason: HOSPADM

## 2023-05-18 RX ORDER — ACETAMINOPHEN 325 MG/1
975 TABLET ORAL ONCE
Status: COMPLETED | OUTPATIENT
Start: 2023-05-18 | End: 2023-05-18

## 2023-05-18 RX ORDER — ONDANSETRON 4 MG/1
4 TABLET, ORALLY DISINTEGRATING ORAL EVERY 8 HOURS PRN
Status: DISCONTINUED | OUTPATIENT
Start: 2023-05-18 | End: 2023-05-19 | Stop reason: HOSPADM

## 2023-05-18 RX ORDER — TRANEXAMIC ACID 100 MG/ML
INJECTION, SOLUTION INTRAVENOUS PRN
Status: DISCONTINUED | OUTPATIENT
Start: 2023-05-18 | End: 2023-05-18 | Stop reason: SDUPTHER

## 2023-05-18 RX ORDER — ASPIRIN 81 MG/1
81 TABLET ORAL 2 TIMES DAILY
Qty: 60 TABLET | Refills: 3 | Status: SHIPPED | OUTPATIENT
Start: 2023-05-18 | End: 2023-05-19 | Stop reason: HOSPADM

## 2023-05-18 RX ORDER — DEXAMETHASONE SODIUM PHOSPHATE 4 MG/ML
INJECTION, SOLUTION INTRA-ARTICULAR; INTRALESIONAL; INTRAMUSCULAR; INTRAVENOUS; SOFT TISSUE PRN
Status: DISCONTINUED | OUTPATIENT
Start: 2023-05-18 | End: 2023-05-18 | Stop reason: SDUPTHER

## 2023-05-18 RX ORDER — SODIUM CHLORIDE 9 MG/ML
INJECTION, SOLUTION INTRAVENOUS CONTINUOUS
Status: DISCONTINUED | OUTPATIENT
Start: 2023-05-18 | End: 2023-05-19 | Stop reason: HOSPADM

## 2023-05-18 RX ORDER — DIPHENHYDRAMINE HYDROCHLORIDE 50 MG/ML
25 INJECTION INTRAMUSCULAR; INTRAVENOUS EVERY 6 HOURS PRN
Status: DISCONTINUED | OUTPATIENT
Start: 2023-05-18 | End: 2023-05-19 | Stop reason: HOSPADM

## 2023-05-18 RX ORDER — SODIUM CHLORIDE 9 MG/ML
INJECTION, SOLUTION INTRAVENOUS PRN
Status: DISCONTINUED | OUTPATIENT
Start: 2023-05-18 | End: 2023-05-19 | Stop reason: HOSPADM

## 2023-05-18 RX ORDER — MIDAZOLAM HYDROCHLORIDE 1 MG/ML
INJECTION INTRAMUSCULAR; INTRAVENOUS PRN
Status: DISCONTINUED | OUTPATIENT
Start: 2023-05-18 | End: 2023-05-18 | Stop reason: SDUPTHER

## 2023-05-18 RX ORDER — FAMOTIDINE 20 MG/1
20 TABLET, FILM COATED ORAL 2 TIMES DAILY
Status: DISCONTINUED | OUTPATIENT
Start: 2023-05-18 | End: 2023-05-19 | Stop reason: HOSPADM

## 2023-05-18 RX ORDER — OXYCODONE HYDROCHLORIDE 5 MG/1
5 TABLET ORAL EVERY 4 HOURS PRN
Status: DISCONTINUED | OUTPATIENT
Start: 2023-05-18 | End: 2023-05-19 | Stop reason: HOSPADM

## 2023-05-18 RX ORDER — DIPHENHYDRAMINE HYDROCHLORIDE 50 MG/ML
12.5 INJECTION INTRAMUSCULAR; INTRAVENOUS
Status: DISCONTINUED | OUTPATIENT
Start: 2023-05-18 | End: 2023-05-18 | Stop reason: HOSPADM

## 2023-05-18 RX ORDER — OXYCODONE HYDROCHLORIDE 5 MG/1
10 TABLET ORAL
Status: COMPLETED | OUTPATIENT
Start: 2023-05-18 | End: 2023-05-18

## 2023-05-18 RX ORDER — 0.9 % SODIUM CHLORIDE 0.9 %
500 INTRAVENOUS SOLUTION INTRAVENOUS ONCE
Status: DISCONTINUED | OUTPATIENT
Start: 2023-05-18 | End: 2023-05-19 | Stop reason: HOSPADM

## 2023-05-18 RX ORDER — SODIUM CHLORIDE 0.9 % (FLUSH) 0.9 %
5-40 SYRINGE (ML) INJECTION PRN
Status: DISCONTINUED | OUTPATIENT
Start: 2023-05-18 | End: 2023-05-19 | Stop reason: HOSPADM

## 2023-05-18 RX ORDER — ONDANSETRON 2 MG/ML
4 INJECTION INTRAMUSCULAR; INTRAVENOUS EVERY 6 HOURS PRN
Status: DISCONTINUED | OUTPATIENT
Start: 2023-05-18 | End: 2023-05-19 | Stop reason: HOSPADM

## 2023-05-18 RX ORDER — SODIUM CHLORIDE, SODIUM LACTATE, POTASSIUM CHLORIDE, CALCIUM CHLORIDE 600; 310; 30; 20 MG/100ML; MG/100ML; MG/100ML; MG/100ML
INJECTION, SOLUTION INTRAVENOUS CONTINUOUS
Status: DISCONTINUED | OUTPATIENT
Start: 2023-05-18 | End: 2023-05-19 | Stop reason: HOSPADM

## 2023-05-18 RX ORDER — BISACODYL 5 MG/1
5 TABLET, DELAYED RELEASE ORAL DAILY PRN
Status: DISCONTINUED | OUTPATIENT
Start: 2023-05-18 | End: 2023-05-19 | Stop reason: HOSPADM

## 2023-05-18 RX ORDER — BISACODYL 10 MG
10 SUPPOSITORY, RECTAL RECTAL DAILY PRN
Status: DISCONTINUED | OUTPATIENT
Start: 2023-05-18 | End: 2023-05-19 | Stop reason: HOSPADM

## 2023-05-18 RX ORDER — DIPHENHYDRAMINE HCL 25 MG
25 CAPSULE ORAL EVERY 6 HOURS PRN
Status: DISCONTINUED | OUTPATIENT
Start: 2023-05-18 | End: 2023-05-19 | Stop reason: HOSPADM

## 2023-05-18 RX ORDER — ROPIVACAINE HYDROCHLORIDE 2 MG/ML
INJECTION, SOLUTION EPIDURAL; INFILTRATION; PERINEURAL PRN
Status: DISCONTINUED | OUTPATIENT
Start: 2023-05-18 | End: 2023-05-18 | Stop reason: SDUPTHER

## 2023-05-18 RX ORDER — EPHEDRINE SULFATE/0.9% NACL/PF 50 MG/5 ML
SYRINGE (ML) INTRAVENOUS PRN
Status: DISCONTINUED | OUTPATIENT
Start: 2023-05-18 | End: 2023-05-18 | Stop reason: SDUPTHER

## 2023-05-18 RX ORDER — ONDANSETRON 2 MG/ML
INJECTION INTRAMUSCULAR; INTRAVENOUS PRN
Status: DISCONTINUED | OUTPATIENT
Start: 2023-05-18 | End: 2023-05-18 | Stop reason: SDUPTHER

## 2023-05-18 RX ORDER — VITAMIN B COMPLEX
4000 TABLET ORAL EVERY MORNING
Status: DISCONTINUED | OUTPATIENT
Start: 2023-05-19 | End: 2023-05-19 | Stop reason: HOSPADM

## 2023-05-18 RX ORDER — OXYCODONE HYDROCHLORIDE 5 MG/1
10 TABLET ORAL EVERY 4 HOURS PRN
Status: DISCONTINUED | OUTPATIENT
Start: 2023-05-18 | End: 2023-05-19 | Stop reason: HOSPADM

## 2023-05-18 RX ORDER — ACETAMINOPHEN 325 MG/1
650 TABLET ORAL EVERY 4 HOURS
Qty: 120 TABLET | Refills: 1 | Status: SHIPPED | OUTPATIENT
Start: 2023-05-18 | End: 2023-06-17

## 2023-05-18 RX ORDER — LIDOCAINE HYDROCHLORIDE 10 MG/ML
1 INJECTION, SOLUTION EPIDURAL; INFILTRATION; INTRACAUDAL; PERINEURAL
Status: DISCONTINUED | OUTPATIENT
Start: 2023-05-18 | End: 2023-05-18 | Stop reason: HOSPADM

## 2023-05-18 RX ORDER — SENNA AND DOCUSATE SODIUM 50; 8.6 MG/1; MG/1
1 TABLET, FILM COATED ORAL 2 TIMES DAILY
Status: DISCONTINUED | OUTPATIENT
Start: 2023-05-18 | End: 2023-05-19 | Stop reason: HOSPADM

## 2023-05-18 RX ORDER — TRAMADOL HYDROCHLORIDE 50 MG/1
50 TABLET ORAL EVERY 6 HOURS PRN
Qty: 30 TABLET | Refills: 0 | Status: SHIPPED | OUTPATIENT
Start: 2023-05-18 | End: 2023-05-25

## 2023-05-18 RX ORDER — OXYCODONE HYDROCHLORIDE 5 MG/1
5 TABLET ORAL
Status: COMPLETED | OUTPATIENT
Start: 2023-05-18 | End: 2023-05-18

## 2023-05-18 RX ORDER — HYDROMORPHONE HCL 110MG/55ML
PATIENT CONTROLLED ANALGESIA SYRINGE INTRAVENOUS PRN
Status: DISCONTINUED | OUTPATIENT
Start: 2023-05-18 | End: 2023-05-18 | Stop reason: SDUPTHER

## 2023-05-18 RX ORDER — FENTANYL CITRATE 50 UG/ML
INJECTION, SOLUTION INTRAMUSCULAR; INTRAVENOUS PRN
Status: DISCONTINUED | OUTPATIENT
Start: 2023-05-18 | End: 2023-05-18 | Stop reason: SDUPTHER

## 2023-05-18 RX ORDER — OXYCODONE HYDROCHLORIDE 5 MG/1
5 TABLET ORAL EVERY 4 HOURS PRN
Qty: 42 TABLET | Refills: 0 | Status: SHIPPED | OUTPATIENT
Start: 2023-05-18 | End: 2023-05-25

## 2023-05-18 RX ORDER — ACETAMINOPHEN 325 MG/1
650 TABLET ORAL EVERY 6 HOURS
Status: DISCONTINUED | OUTPATIENT
Start: 2023-05-18 | End: 2023-05-19 | Stop reason: HOSPADM

## 2023-05-18 RX ORDER — CELECOXIB 100 MG/1
100 CAPSULE ORAL 2 TIMES DAILY
Status: DISCONTINUED | OUTPATIENT
Start: 2023-05-18 | End: 2023-05-19 | Stop reason: HOSPADM

## 2023-05-18 RX ADMIN — ONDANSETRON HYDROCHLORIDE 4 MG: 2 SOLUTION INTRAMUSCULAR; INTRAVENOUS at 13:50

## 2023-05-18 RX ADMIN — TRANEXAMIC ACID 1000 MG: 100 INJECTION, SOLUTION INTRAVENOUS at 12:28

## 2023-05-18 RX ADMIN — DEXAMETHASONE SODIUM PHOSPHATE 8 MG: 4 INJECTION, SOLUTION INTRAMUSCULAR; INTRAVENOUS at 12:33

## 2023-05-18 RX ADMIN — FENTANYL CITRATE 50 MCG: 50 INJECTION, SOLUTION INTRAMUSCULAR; INTRAVENOUS at 12:15

## 2023-05-18 RX ADMIN — DOCUSATE SODIUM 50 MG AND SENNOSIDES 8.6 MG 1 TABLET: 8.6; 5 TABLET, FILM COATED ORAL at 20:23

## 2023-05-18 RX ADMIN — MIDAZOLAM HYDROCHLORIDE 2 MG: 1 INJECTION, SOLUTION INTRAMUSCULAR; INTRAVENOUS at 11:18

## 2023-05-18 RX ADMIN — FENTANYL CITRATE 25 MCG: 50 INJECTION, SOLUTION INTRAMUSCULAR; INTRAVENOUS at 12:44

## 2023-05-18 RX ADMIN — FENTANYL CITRATE 50 MCG: 50 INJECTION, SOLUTION INTRAMUSCULAR; INTRAVENOUS at 11:18

## 2023-05-18 RX ADMIN — FENTANYL CITRATE 50 MCG: 50 INJECTION, SOLUTION INTRAMUSCULAR; INTRAVENOUS at 11:21

## 2023-05-18 RX ADMIN — TRANEXAMIC ACID 1000 MG: 100 INJECTION, SOLUTION INTRAVENOUS at 13:38

## 2023-05-18 RX ADMIN — OXYCODONE HYDROCHLORIDE 10 MG: 5 TABLET ORAL at 10:32

## 2023-05-18 RX ADMIN — ATORVASTATIN CALCIUM 20 MG: 20 TABLET, FILM COATED ORAL at 22:30

## 2023-05-18 RX ADMIN — FAMOTIDINE 20 MG: 20 TABLET, FILM COATED ORAL at 20:23

## 2023-05-18 RX ADMIN — FENTANYL CITRATE 25 MCG: 50 INJECTION, SOLUTION INTRAMUSCULAR; INTRAVENOUS at 12:38

## 2023-05-18 RX ADMIN — OXYCODONE HYDROCHLORIDE 5 MG: 5 TABLET ORAL at 15:01

## 2023-05-18 RX ADMIN — SODIUM CHLORIDE, PRESERVATIVE FREE 10 ML: 5 INJECTION INTRAVENOUS at 21:00

## 2023-05-18 RX ADMIN — PREGABALIN 75 MG: 75 CAPSULE ORAL at 10:33

## 2023-05-18 RX ADMIN — LIDOCAINE HYDROCHLORIDE 60 MG: 20 INJECTION, SOLUTION EPIDURAL; INFILTRATION; INTRACAUDAL; PERINEURAL at 12:16

## 2023-05-18 RX ADMIN — Medication 10 MG: at 12:33

## 2023-05-18 RX ADMIN — CEFAZOLIN 2000 MG: 1 INJECTION, POWDER, FOR SOLUTION INTRAMUSCULAR; INTRAVENOUS at 20:24

## 2023-05-18 RX ADMIN — SODIUM CHLORIDE: 9 INJECTION, SOLUTION INTRAVENOUS at 16:30

## 2023-05-18 RX ADMIN — HYDROMORPHONE HYDROCHLORIDE 0.5 MG: 2 INJECTION, SOLUTION INTRAMUSCULAR; INTRAVENOUS; SUBCUTANEOUS at 14:30

## 2023-05-18 RX ADMIN — ROPIVACAINE HYDROCHLORIDE 20 ML: 2 INJECTION, SOLUTION EPIDURAL; INFILTRATION at 11:24

## 2023-05-18 RX ADMIN — ACETAMINOPHEN 975 MG: 325 TABLET ORAL at 10:32

## 2023-05-18 RX ADMIN — SODIUM CHLORIDE, POTASSIUM CHLORIDE, SODIUM LACTATE AND CALCIUM CHLORIDE: 600; 310; 30; 20 INJECTION, SOLUTION INTRAVENOUS at 10:34

## 2023-05-18 RX ADMIN — CELECOXIB 100 MG: 100 CAPSULE ORAL at 20:24

## 2023-05-18 RX ADMIN — ACETAMINOPHEN 650 MG: 325 TABLET ORAL at 18:39

## 2023-05-18 RX ADMIN — SODIUM CHLORIDE, POTASSIUM CHLORIDE, SODIUM LACTATE AND CALCIUM CHLORIDE: 600; 310; 30; 20 INJECTION, SOLUTION INTRAVENOUS at 14:00

## 2023-05-18 RX ADMIN — HYDROMORPHONE HYDROCHLORIDE 0.5 MG: 2 INJECTION, SOLUTION INTRAMUSCULAR; INTRAVENOUS; SUBCUTANEOUS at 14:26

## 2023-05-18 RX ADMIN — CEFAZOLIN SODIUM 2000 MG: 1 POWDER, FOR SOLUTION INTRAMUSCULAR; INTRAVENOUS at 12:25

## 2023-05-18 ASSESSMENT — PAIN DESCRIPTION - ORIENTATION
ORIENTATION: LEFT
ORIENTATION: LEFT;INNER
ORIENTATION: LEFT;INNER

## 2023-05-18 ASSESSMENT — PAIN SCALES - GENERAL
PAINLEVEL_OUTOF10: 3
PAINLEVEL_OUTOF10: 5
PAINLEVEL_OUTOF10: 5
PAINLEVEL_OUTOF10: 3

## 2023-05-18 ASSESSMENT — PAIN DESCRIPTION - DESCRIPTORS
DESCRIPTORS: DULL;ACHING;SORE
DESCRIPTORS: PRESSURE;TIGHTNESS
DESCRIPTORS: ACHING

## 2023-05-18 ASSESSMENT — PAIN DESCRIPTION - LOCATION
LOCATION: KNEE
LOCATION: LEG

## 2023-05-18 ASSESSMENT — PAIN DESCRIPTION - PAIN TYPE
TYPE: SURGICAL PAIN
TYPE: SURGICAL PAIN
TYPE: CHRONIC PAIN

## 2023-05-18 ASSESSMENT — PAIN - FUNCTIONAL ASSESSMENT
PAIN_FUNCTIONAL_ASSESSMENT: ACTIVITIES ARE NOT PREVENTED
PAIN_FUNCTIONAL_ASSESSMENT: ACTIVITIES ARE NOT PREVENTED

## 2023-05-18 ASSESSMENT — PAIN DESCRIPTION - FREQUENCY: FREQUENCY: CONTINUOUS

## 2023-05-18 ASSESSMENT — PAIN DESCRIPTION - ONSET: ONSET: ON-GOING

## 2023-05-18 NOTE — PERIOP NOTE
POST ANESTHESIA CARE    DISCHARGE / TRANSFER NOTE    TRANSFER - OUT REPORT:    [] Verbal / Bedside  [x] Phone      report  was/will-be given at 4:40 PM to   [facility]  RN  receiving   Elizabeth Calvert   and being transferred to      [] L&D  [] ICU  [] [facility]  [] MIU  [x] 4th Ortho/Surgical  [] 5th Medical/Surgical     for   routine progression of patient care  Following Spinal for Procedure(s) (LRB):  LEFT TOTAL KNEE ARTHROPLASTY (Left) by  Sonya Barros MD.    Patient Situation, Background, Assessment and Recommendations (SBAR) was provided and included information from the following SBAR report(s) (Nurse Handoff Report and Surgery Report) which were reviewed with the receiving nurse. Lines:       Also Reviewed (checked if applicable):   [] NO ADDITIONAL REVIEWS  [] PCA Drug and Settings     [x] Cold Therapy with extra gels     [] On-Q @  ml/hr   [] Drains x       [] Significant Wound care/devices (e.g. Wound vac, etc.)     [] Holding pt in PACU awaiting bed availability - additional care provided and eMAR review  [] Vent Settings      [] Critical Care Drips  Contact Precautions: No active isolations  Patient transported with:  Registered Nurse    Elizabeth Calvert was:    [] discharged        via   [] Wheelchair          to [] Private Vehicle     [x] transferred    [] Carried    [] Taxi / Vehicle \"for Hire\"  [] Walk out   [] Ambulance / Medical Transportation   [] Stretcher   [x] Hospital room _429_           [x] Bed      Patient was escorted by:      [x] Nurse   [] Volunteer  [] Transporter / Technician  [] Parent      [] Spouse / Family /      Patient verbalized     [x] appreciation and was very pleased with care received   [] frustration with care received       throughout their stay. Patient was discharged in     [x] pleasant mood  [] sad mood  [] mad mood . Pain at discharge/transfer was      3  /10.     Discharge, medication and follow-up instructions were verbalized as understood prior to

## 2023-05-18 NOTE — ANESTHESIA PROCEDURE NOTES
Peripheral Block    Patient location during procedure: pre-op  Reason for block: procedure for pain, post-op pain management, primary anesthetic and at surgeon's request  Start time: 5/18/2023 11:18 AM  End time: 5/18/2023 11:24 AM  Staffing  Performed: anesthesiologist   Anesthesiologist: Breanna Chong MD  Preanesthetic Checklist  Completed: patient identified, IV checked, site marked, risks and benefits discussed, surgical/procedural consents, pre-op evaluation, timeout performed, anesthesia consent given, oxygen available and monitors applied/VS acknowledged  Peripheral Block   Patient position: supine  Prep: ChloraPrep  Provider prep: mask and sterile gloves  Patient monitoring: cardiac monitor, continuous pulse ox, continuous capnometry, frequent blood pressure checks, IV access, oxygen and responsive to questions  Block type: iPacks  Laterality: left  Injection technique: single-shot  Guidance: ultrasound guided    Needle   Needle type: Other   Needle gauge: 21 G  Needle localization: ultrasound guidance  Needle length: 10 cmOther needle type: STIMUPLEX  Assessment   Injection assessment: negative aspiration for heme, no paresthesia on injection, local visualized surrounding nerve on ultrasound and no intravascular symptoms  Hemodynamics: stable  Outcomes: patient tolerated procedure well    Additional Notes  Olga ZENG witnessed timeout and block written on correct side.

## 2023-05-18 NOTE — ANESTHESIA PRE PROCEDURE
Department of Anesthesiology  Preprocedure Note       Name:  Luis Angel Penaloza   Age:  78 y.o.  :  1955                                          MRN:  [id number]         Date:  2023      Surgeon: Capo Barry):  Kristian Crockett MD    Procedure: Procedure(s):  LEFT TOTAL KNEE ARTHROPLASTY    Medications prior to admission:   Prior to Admission medications    Medication Sig Start Date End Date Taking? Authorizing Provider   traMADol (ULTRAM) 50 MG tablet Take 1 tablet by mouth every 6 hours as needed for Pain for up to 7 days. Max Daily Amount: 200 mg 23 Yes Kristian Crockett MD   ibuprofen (ADVIL;MOTRIN) 800 MG tablet Take 1 tablet by mouth in the morning and 1 tablet at noon and 1 tablet in the evening and 1 tablet before bedtime. 23  Yes Kristian Crockett MD   acetaminophen (TYLENOL) 325 MG tablet Take 2 tablets by mouth every 4 hours 23 Yes Kristian Crockett MD   oxyCODONE (ROXICODONE) 5 MG immediate release tablet Take 1 tablet by mouth every 4 hours as needed for Pain for up to 7 days.  Max Daily Amount: 30 mg 23 Yes Kristain Crockett MD   aspirin (ASPIRIN 81) 81 MG EC tablet Take 1 tablet by mouth 2 times daily 23  Yes Kristian Crockett MD   Leuprolide Acetate, 3 Month, (ELIGARD) 22.5 MG KIT Inject 22.5 mg into the skin every 3 months    Historical Provider, MD   Glucosamine-Chondroitin-MSM (GLUCOSAMINE CHONDROIT MSM DS PO) Take 2 tablets by mouth every morning 1500mg-1200mg    Historical Provider, MD   Calcium-Magnesium-Vitamin D (CITRACAL CALCIUM+D PO) Take 2 tablets by mouth in the morning and at bedtime 650 mg    Historical Provider, MD   abiraterone acetate (ZYTIGA) 250 MG tablet Take 3 tablets by mouth every morning    Ar Automatic Reconciliation   atorvastatin (LIPITOR) 20 MG tablet Take 1 tablet by mouth nightly    Ar Automatic Reconciliation   Cholecalciferol 50 MCG ( UT) TABS Take 2 tablets by mouth every morning    Ar Automatic Reconciliation   predniSONE

## 2023-05-18 NOTE — ANESTHESIA POSTPROCEDURE EVALUATION
Department of Anesthesiology  Postprocedure Note    Patient: Jennifer Lynch  MRN: [id number]  YOB: 1955  Date of evaluation: 5/18/2023      Procedure Summary     Date: 05/18/23 Room / Location: [facility] ASU OR  / [facility] AMBULATORY OR    Anesthesia Start: 1207 Anesthesia Stop: 1430    Procedure: LEFT TOTAL KNEE ARTHROPLASTY (Left: Knee) Diagnosis:       Primary osteoarthritis of left knee      (Primary osteoarthritis of left knee [M17.12])    Surgeons: Ana Cristina Delgado MD Responsible Provider: Sara Herrera MD    Anesthesia Type: General ASA Status: 3          Anesthesia Type: General    Geoff Phase I: Geoff Score: 10    Geoff Phase II:        Anesthesia Post Evaluation    Patient location during evaluation: PACU  Patient participation: complete - patient participated  Level of consciousness: awake  Airway patency: patent  Nausea & Vomiting: no vomiting and no nausea  Complications: no  Cardiovascular status: hemodynamically stable  Respiratory status: acceptable  Hydration status: stable

## 2023-05-18 NOTE — PERIOP NOTE
PACU IN REPORT FROM ANESTHESIA    Verbal report received from   Maritza Mcleod   [] MD/DO-Anesthesiologist    [x] CRNA   [] with student    CHOICE ANESTHESIA:  [x] GENERAL  [] TIVA  [] MAC  [] LOCAL  [x] REGIONAL  [] SPINAL   [] EPIDURAL   **Note the anesthesia record for medications given intraoperatively. **           [] E.R.A.S. PROTOCOL    SURGICAL PROCEDURE: Procedure(s) (LRB):  LEFT TOTAL KNEE ARTHROPLASTY (Left)     SURGEON: Ronald Hua MD.    Brief Initial Visual Assessment:    Patient Age: [] Infant(1-12mo)       []Pediatric(1-13yrs)    [] Adolescent(13-18yrs)     [] Adult(18-65yrs)      [x]Geriatric Adult(>65yrs). Patient    [x] Alert           [x]Calm & Cooperative      [] Anxious/Restless      [] Combative  Appearance:  [x] Drowsy      [] Confused/Disoriented     [] Sedated      [] Unresponsive     Oriented x  3            Airway:     [x] Patent          [] \"Difficult Airway\" report by Anesthesia                        [] Obstructs easily/Obstructed on arrival          [] Manual stimulation and/or airway assistance necessary                         [] Airway improved with head/airway repositioning                       Airway Adjuncts Present: [] Oral Airway    [] Nasal Trumpet    [] ETT    [] LMA            Respiratory  [x] Even   [] Labored   [] Shallow   [] Tachypnea (>28 RR/min)  [] Bradypnea (<10 RR/min)  Pattern:    [x] Non-Labored  [] VENT and/or respiratory assistance     being provided. Skin:     [x] Pink [] Dusky    [] Pale         [x] Warm     [] Hot [] Cool       [] Cold    [x]Dry     [] Moist [] Diaphoretic     Membranes:  [x] Pink    [] Pale        [x] Moist [] Dry     [] Crusty     Pain:   [x] No Acute Discomfort. 6  /10 Scale [x] Verbal Numeric / Visual   [] Moderate Discomfort.     [] V.A.S. [] Acute Discomfort.                [] A.N.V.    [] Chronic-Issue Related Discomfort.   [] F.L.A.C.C. Note E-MAR for medications administered.   []Faces, Smith/Nesbitt    Note

## 2023-05-18 NOTE — OP NOTE
was closed with 2-0 Vicryl 4-0 monocryl suture and Dermabond. A sterile dressing was applied. The patient was taken from the operating room in stable condition. OPERATIVE FINDINGS : Severe varus OA, Final ROM 0-135    IMPLANTS :   Implant Name Type Inv. Item Serial No.  Lot No. LRB No. Used Action   COMPONENT FEM SZ 5 L KNEE RICHELLE APATITE CRUCE RET CEMENTLESS - [id number]  COMPONENT FEM SZ 5 L KNEE RICHELLE APATITE CRUCE RET CEMENTLESS  [employer]ISSES [employer] [id number] Left 1 Implanted   BASEPLATE TIB SZ 6 [id number] ML77MM KNEE TRITANIUM 4 CRUCFRM - [id number]  BASEPLATE TIB SZ 6 [id number] ML77MM KNEE TRITANIUM 4 CRUCFRM  [employer] Ubertesters [id number] Left 1 Implanted   TRIATHLON TIBIAL BEARING INSERT    [employer]ISSES [employer]CR [id number] Left 1 Implanted   COMPONENT PAT [id number] [id number] SUPERIOR/INFERIOR KNEE - [id number]  COMPONENT PAT [id number] SUPERIOR/INFERIOR KNEE  ULISSES[employer] Wet Seal[employer]testers [id number] Left 1 Implanted       POST OPERATIVE CONSIDERATIONS :  WBAT    JUSTIFICATION FOR SURGICAL ASSISTANT:   Surgical Assistant, was requried and necessary in this case, to help with soft tissue retraction, extremity positioning, equiment management, implant management, and wound closure.     Nini Anderson MD

## 2023-05-19 VITALS
DIASTOLIC BLOOD PRESSURE: 74 MMHG | BODY MASS INDEX: 23.8 KG/M2 | WEIGHT: 175.71 LBS | HEIGHT: 72 IN | SYSTOLIC BLOOD PRESSURE: 125 MMHG | HEART RATE: 61 BPM | RESPIRATION RATE: 18 BRPM | OXYGEN SATURATION: 97 % | TEMPERATURE: 98.5 F

## 2023-05-19 LAB
ANION GAP SERPL CALC-SCNC: 1 MMOL/L (ref 5–15)
BUN SERPL-MCNC: 17 MG/DL (ref 6–20)
BUN/CREAT SERPL: 25 (ref 12–20)
CALCIUM SERPL-MCNC: 8.4 MG/DL (ref 8.5–10.1)
CHLORIDE SERPL-SCNC: 111 MMOL/L (ref 97–108)
CO2 SERPL-SCNC: 27 MMOL/L (ref 21–32)
CREAT SERPL-MCNC: 0.69 MG/DL (ref 0.7–1.3)
GLUCOSE SERPL-MCNC: 111 MG/DL (ref 65–100)
HCT VFR BLD AUTO: 35 % (ref 36.6–50.3)
HGB BLD-MCNC: 11.9 G/DL (ref 12.1–17)
POTASSIUM SERPL-SCNC: 4.2 MMOL/L (ref 3.5–5.1)
SODIUM SERPL-SCNC: 139 MMOL/L (ref 136–145)

## 2023-05-19 PROCEDURE — G0378 HOSPITAL OBSERVATION PER HR: HCPCS

## 2023-05-19 PROCEDURE — 85018 HEMOGLOBIN: CPT

## 2023-05-19 PROCEDURE — 97165 OT EVAL LOW COMPLEX 30 MIN: CPT

## 2023-05-19 PROCEDURE — 97535 SELF CARE MNGMENT TRAINING: CPT

## 2023-05-19 PROCEDURE — 85014 HEMATOCRIT: CPT

## 2023-05-19 PROCEDURE — 6370000000 HC RX 637 (ALT 250 FOR IP): Performed by: PHYSICIAN ASSISTANT

## 2023-05-19 PROCEDURE — 97116 GAIT TRAINING THERAPY: CPT

## 2023-05-19 PROCEDURE — 6360000002 HC RX W HCPCS: Performed by: PHYSICIAN ASSISTANT

## 2023-05-19 PROCEDURE — 97110 THERAPEUTIC EXERCISES: CPT

## 2023-05-19 PROCEDURE — 2580000003 HC RX 258: Performed by: PHYSICIAN ASSISTANT

## 2023-05-19 PROCEDURE — 97530 THERAPEUTIC ACTIVITIES: CPT

## 2023-05-19 PROCEDURE — APPNB45 APP NON BILLABLE 31-45 MINUTES: Performed by: NURSE PRACTITIONER

## 2023-05-19 PROCEDURE — 36415 COLL VENOUS BLD VENIPUNCTURE: CPT

## 2023-05-19 PROCEDURE — 80048 BASIC METABOLIC PNL TOTAL CA: CPT

## 2023-05-19 RX ORDER — ONDANSETRON 4 MG/1
4 TABLET, ORALLY DISINTEGRATING ORAL 3 TIMES DAILY PRN
Qty: 30 TABLET | Refills: 0 | Status: SHIPPED | OUTPATIENT
Start: 2023-05-19

## 2023-05-19 RX ORDER — DOCUSATE SODIUM 100 MG/1
100 CAPSULE, LIQUID FILLED ORAL 2 TIMES DAILY
Qty: 60 CAPSULE | Refills: 0 | Status: SHIPPED | OUTPATIENT
Start: 2023-05-19 | End: 2023-06-18

## 2023-05-19 RX ADMIN — OXYCODONE 5 MG: 5 TABLET ORAL at 08:17

## 2023-05-19 RX ADMIN — APIXABAN 2.5 MG: 2.5 TABLET, FILM COATED ORAL at 08:17

## 2023-05-19 RX ADMIN — DOCUSATE SODIUM 50 MG AND SENNOSIDES 8.6 MG 1 TABLET: 8.6; 5 TABLET, FILM COATED ORAL at 08:17

## 2023-05-19 RX ADMIN — CEFAZOLIN 2000 MG: 1 INJECTION, POWDER, FOR SOLUTION INTRAMUSCULAR; INTRAVENOUS at 04:43

## 2023-05-19 RX ADMIN — ACETAMINOPHEN 650 MG: 325 TABLET ORAL at 01:24

## 2023-05-19 RX ADMIN — ACETAMINOPHEN 650 MG: 325 TABLET ORAL at 06:46

## 2023-05-19 RX ADMIN — PREDNISONE 5 MG: 5 TABLET ORAL at 08:17

## 2023-05-19 RX ADMIN — CELECOXIB 100 MG: 100 CAPSULE ORAL at 08:17

## 2023-05-19 RX ADMIN — FAMOTIDINE 20 MG: 20 TABLET, FILM COATED ORAL at 08:17

## 2023-05-19 RX ADMIN — SODIUM CHLORIDE, PRESERVATIVE FREE 10 ML: 5 INJECTION INTRAVENOUS at 08:18

## 2023-05-19 RX ADMIN — ACETAMINOPHEN 650 MG: 325 TABLET ORAL at 13:16

## 2023-05-19 RX ADMIN — OXYCODONE 5 MG: 5 TABLET ORAL at 15:13

## 2023-05-19 RX ADMIN — Medication 4000 UNITS: at 08:16

## 2023-05-19 ASSESSMENT — PAIN DESCRIPTION - ORIENTATION
ORIENTATION: LEFT

## 2023-05-19 ASSESSMENT — PAIN SCALES - GENERAL
PAINLEVEL_OUTOF10: 4
PAINLEVEL_OUTOF10: 5
PAINLEVEL_OUTOF10: 3
PAINLEVEL_OUTOF10: 3
PAINLEVEL_OUTOF10: 4
PAINLEVEL_OUTOF10: 7
PAINLEVEL_OUTOF10: 7

## 2023-05-19 ASSESSMENT — PAIN DESCRIPTION - PAIN TYPE: TYPE: SURGICAL PAIN

## 2023-05-19 ASSESSMENT — PAIN DESCRIPTION - LOCATION
LOCATION: KNEE

## 2023-05-19 ASSESSMENT — PAIN DESCRIPTION - DESCRIPTORS
DESCRIPTORS: ACHING
DESCRIPTORS: ACHING;SORE
DESCRIPTORS: TIGHTNESS;SORE
DESCRIPTORS: TIGHTNESS;SORE
DESCRIPTORS: ACHING
DESCRIPTORS: TIGHTNESS
DESCRIPTORS: TIGHTNESS

## 2023-05-19 NOTE — PROGRESS NOTES
DAILY NOTE     ASSESSMENT / PLAN :   Disposition : Home discharge today. Comments : Doing well, home today. POD  1 Day Post-Op s/p Procedure(s):  LEFT TOTAL KNEE ARTHROPLASTY     SUBJECTIVE :     Concerns : None - doing well. OBJECTIVE :     [unfilled]    Alert and oriented x3. Dressing C/D/I  Sensation is intact to light touch. No calf pain.         ANTICOAGULANTS / LABS :       [unfilled]    Labs:  Recent Labs     05/19/23  0128   HGB 11.9*   HCT 35.0*      K 4.2   *   CO2 27   BUN 17        Patient mobility           Chan Baum MD  Cell [phone]  Cristino Sacks, PA-C  Cell [phone]  Medical Assistants: [address] [phone]                 Surgery Scheduler: Olga Mosley [address] [phone] ext [phone]

## 2023-05-19 NOTE — PROGRESS NOTES
Orthopaedic Progress Note  Post Op day: 1 Day Post-Op    May 19, 2023 1:50 PM     Patient: Gardenia Solorio MRN: [id number]  SSN: [id number]    YOB: 1955  Age: 78 y.o. Sex: male      Admit date:  2023  Date of Surgery:  [unfilled]   Procedures:  Procedure(s):  LEFT TOTAL KNEE ARTHROPLASTY  Admitting Physician:  Claire Harrington MD   Surgeon:  Melissa Langford) and Role:     * Claire Harrington MD - Primary    Consulting Physician(s): Treatment Team: Attending Provider: Claire Harrington MD; Surgeon: Claire Harrington MD; Registered Nurse: Addi Armijo RN; Occupational Therapist: Vivek Becerra OT; Physical Therapist Assistant: Mary Pina PTA; : Helena Camacho    SUBJECTIVE:     Gardenia Solorio is a 78 y.o. male is 1 Day Post-Op s/p Procedure(s):  LEFT TOTAL KNEE ARTHROPLASTY with an appropriate level of post-operative pain. No complaints of nausea, vomiting, dizziness, lightheadedness, chest pain, or shortness of breath. OBJECTIVE:       Physical Exam:  General: Alert, cooperative, no distress. Respiratory: Respirations unlabored  Neurological:  Neurovascular exam within normal limits. Motor: + DF/PF. Dressing/Wound:  Clean, dry and intact. No significant erythema or swelling.       Vital Signs:      Patient Vitals for the past 8 hrs:   BP Temp Temp src Pulse Resp SpO2   23 1128 125/74 98.5 °F (36.9 °C) Oral 61 18 97 %   23 0819 (!) 146/75 98.5 °F (36.9 °C) Oral 59 16 97 %                                          Temp (24hrs), Av.2 °F (36.8 °C), Min:97.4 °F (36.3 °C), Max:98.6 °F (37 °C)      Labs:        Recent Labs     23  0128   HCT 35.0*   HGB 11.9*     Lab Results   Component Value Date/Time     2023 01:28 AM    K 4.2 2023 01:28 AM     2023 01:28 AM    CO2 27 2023 01:28 AM    BUN 17 2023 01:28 AM         ASSESSMENT / PLAN:   Principal Problem:    Primary osteoarthritis of left knee  Active Problems:

## 2023-05-19 NOTE — PLAN OF CARE
Problem: Physical Therapy - Adult  Goal: By Discharge: Performs mobility at highest level of function for planned discharge setting. See evaluation for individualized goals. Description: FUNCTIONAL STATUS PRIOR TO ADMISSION: Patient was independent and active without use of DME.    HOME SUPPORT PRIOR TO ADMISSION: The patient lived with wife but did not require assistance. Physical Therapy Goals  Initiated 5/18/2023  1. Patient will move from supine to sit and sit to supine, scoot up and down, and roll side to side in bed with independence within 4 day(s). 2.  Patient will perform sit to stand with independence within 4 day(s). 3.  Patient will transfer from bed to chair and chair to bed with modified independence using the least restrictive device within 4 day(s). 4.  Patient will ambulate with modified independence for 150 feet with the least restrictive device within 4 day(s). 5.  Patient will ascend/descend 3 stairs with 1 handrail(s) with modified independence within 4 day(s). 6. Patient will perform knee home exercise program per protocol with modified independence within 4 days. 7. Patient will demonstrate AROM 0-90 degrees in operative joint within 4 days. Outcome: Progressing   PHYSICAL THERAPY EVALUATION    Patient: Arik Malave (4 y.o. male)  Date: 5/18/2023  Primary Diagnosis: Primary osteoarthritis of left knee [M17.12]  Osteoarthritis of left knee [M17.12]  Primary localized osteoarthritis of left knee [M17.12]  Procedure(s) (LRB):  LEFT TOTAL KNEE ARTHROPLASTY (Left) Day of Surgery   Precautions: Weight Bearing   Left Lower Extremity Weight Bearing: Weight Bearing As Tolerated                ASSESSMENT :   DEFICITS/IMPAIRMENTS:   Based on the objective data described below, the patient presents with good return of strength and sensation following L TKA POD#0. Pt able to perform full HEP with verbal cueing for proper performance including heel slides and isometrics.  Pt
Problem: Physical Therapy - Adult  Goal: By Discharge: Performs mobility at highest level of function for planned discharge setting. See evaluation for individualized goals. Description: FUNCTIONAL STATUS PRIOR TO ADMISSION: Patient was independent and active without use of DME.    HOME SUPPORT PRIOR TO ADMISSION: The patient lived with wife but did not require assistance. Physical Therapy Goals  Initiated 5/18/2023  1. Patient will move from supine to sit and sit to supine, scoot up and down, and roll side to side in bed with independence within 4 day(s). 2.  Patient will perform sit to stand with independence within 4 day(s). 3.  Patient will transfer from bed to chair and chair to bed with modified independence using the least restrictive device within 4 day(s). 4.  Patient will ambulate with modified independence for 150 feet with the least restrictive device within 4 day(s). 5.  Patient will ascend/descend 3 stairs with 1 handrail(s) with modified independence within 4 day(s). 6. Patient will perform knee home exercise program per protocol with modified independence within 4 days. 7. Patient will demonstrate AROM 0-90 degrees in operative joint within 4 days. Outcome: Progressing   PHYSICAL THERAPY TREATMENT    Patient: Moriah Walker (89 y.o. male)  Date: 5/19/2023  Diagnosis: Primary osteoarthritis of left knee [M17.12]  Osteoarthritis of left knee [M17.12]  Primary localized osteoarthritis of left knee [M17.12] Primary osteoarthritis of left knee  Procedure(s) (LRB):  LEFT TOTAL KNEE ARTHROPLASTY (Left) 1 Day Post-Op  Precautions: Weight Bearing   Left Lower Extremity Weight Bearing: Weight Bearing As Tolerated                ASSESSMENT:  Patient continues to benefit from skilled PT services and is progressing towards goals. Pt requires SBA for functional mobility tasks, including stair training, using RW or L handrail and SPC for support. No knee instability noted.  Reviewed HEP with
Problem: Safety - Adult  Goal: Free from fall injury  5/19/2023 1152 by Mine Jacques RN  Outcome: Progressing  5/19/2023 0303 by Corinna Arevalo RN  Outcome: Progressing     Problem: Pain  Goal: Verbalizes/displays adequate comfort level or baseline comfort level  5/19/2023 1152 by Mine Jacques RN  Outcome: Progressing  5/19/2023 0303 by Corinna Arevalo RN  Outcome: Progressing     Problem: Discharge Planning  Goal: Discharge to home or other facility with appropriate resources  Outcome: Progressing     Problem: Physical Therapy - Adult  Goal: By Discharge: Performs mobility at highest level of function for planned discharge setting. See evaluation for individualized goals. Description: FUNCTIONAL STATUS PRIOR TO ADMISSION: Patient was independent and active without use of DME.    HOME SUPPORT PRIOR TO ADMISSION: The patient lived with wife but did not require assistance. Physical Therapy Goals  Initiated 5/18/2023  1. Patient will move from supine to sit and sit to supine, scoot up and down, and roll side to side in bed with independence within 4 day(s). 2.  Patient will perform sit to stand with independence within 4 day(s). 3.  Patient will transfer from bed to chair and chair to bed with modified independence using the least restrictive device within 4 day(s). 4.  Patient will ambulate with modified independence for 150 feet with the least restrictive device within 4 day(s). 5.  Patient will ascend/descend 3 stairs with 1 handrail(s) with modified independence within 4 day(s). 6. Patient will perform knee home exercise program per protocol with modified independence within 4 days. 7. Patient will demonstrate AROM 0-90 degrees in operative joint within 4 days.       5/19/2023 1111 by Jaylyn Hale PTA  Outcome: Progressing     Problem: ABCDS Injury Assessment  Goal: Absence of physical injury  5/19/2023 1152 by Mine Jacques RN  Outcome: Progressing  5/19/2023 0303 by Sangita Neal
Problem: Safety - Adult  Goal: Free from fall injury  Outcome: Progressing     Problem: Pain  Goal: Verbalizes/displays adequate comfort level or baseline comfort level  Outcome: Progressing     Problem: Discharge Planning  Goal: Discharge to home or other facility with appropriate resources  Outcome: Progressing     Problem: Physical Therapy - Adult  Goal: By Discharge: Performs mobility at highest level of function for planned discharge setting. See evaluation for individualized goals. Description: FUNCTIONAL STATUS PRIOR TO ADMISSION: Patient was independent and active without use of DME.    HOME SUPPORT PRIOR TO ADMISSION: The patient lived with wife but did not require assistance. Physical Therapy Goals  Initiated 5/18/2023  1. Patient will move from supine to sit and sit to supine, scoot up and down, and roll side to side in bed with independence within 4 day(s). 2.  Patient will perform sit to stand with independence within 4 day(s). 3.  Patient will transfer from bed to chair and chair to bed with modified independence using the least restrictive device within 4 day(s). 4.  Patient will ambulate with modified independence for 150 feet with the least restrictive device within 4 day(s). 5.  Patient will ascend/descend 3 stairs with 1 handrail(s) with modified independence within 4 day(s). 6. Patient will perform knee home exercise program per protocol with modified independence within 4 days. 7. Patient will demonstrate AROM 0-90 degrees in operative joint within 4 days.       5/18/2023 1742 by Madie Dunbar, PT  Outcome: Progressing     Problem: ABCDS Injury Assessment  Goal: Absence of physical injury  Outcome: Progressing
Progressing  5/18/2023 1948 by Jose M Chandler RN  Outcome: Progressing
be of the following complexity level: Low

## 2023-05-19 NOTE — PROGRESS NOTES
Leader rounding and   Rounded on patient  Patient alert and oriented  Follow up from pre-op Joint Patient Education Class. Patient states class information was valuable in preparing for surgery. Patient states their home space is prepared and safe for recovery. Reviewed ice application, leg positioning, exercises and incentive spirometry use. Reviewed call don't fall expectations. Opportunity provided for patient to ask questions and provide comments. Questions answered.    Wife at bedside

## 2023-05-19 NOTE — CARE COORDINATION
5/19/2023 12:54 PM      Charted address and phone numbers confirmed. Pt under Obs.     05/19/23 1252   Service Assessment   Patient Orientation Alert and Oriented   Cognition Alert   History Provided By Patient;Significant Other   Primary Caregiver Self   Support Systems Spouse/Significant Other   Patient's Healthcare Decision Maker is: Legal Next of Kin   PCP Verified by CM Yes   Last Visit to PCP Within last 3 months   Prior Functional Level Independent in ADLs/IADLs   Can patient return to prior living arrangement Yes   Ability to make needs known: Good   Family able to assist with home care needs: Yes   Would you like for me to discuss the discharge plan with any other family members/significant others, and if so, who? No   Financial Resources Medicare   Community Resources None   Social/Functional History   Lives With Spouse   Type of [address] to Live on Main level with bedroom/bathroom   Entrance Stairs - Number of Steps 4   [address];Walker, rolling   Active  Yes   Mode of Transportation Car   Occupation Retired   Discharge Planning   Type of [address] Prior To Admission None   Potential Assistance Needed Outpatient PT/OT   DME Ordered? No   Potential Assistance Purchasing Medications No  (Pt uses the [facility])   [facility]   Transition of [facility] Consult (CM Consult) [address] [address]   [address] Provided? No   Mode of Transport at Discharge Other (see comment)  (Wife)   Confirm Follow Up Transport Family     Pt confirmed he has outpatient PT scheduled on 5/23 at [facility]. No further CM needs identified.    SUSANNAH Carr

## 2023-05-19 NOTE — PROGRESS NOTES
Discharge instructions reviewed with patient and spouse. Pt and wife verbalized understanding and denies questions and concerns. Discharge instructions sent home with patient. Volunteer assistance requested.

## 2024-04-30 SDOH — HEALTH STABILITY: PHYSICAL HEALTH: ON AVERAGE, HOW MANY MINUTES DO YOU ENGAGE IN EXERCISE AT THIS LEVEL?: 30 MIN

## 2024-04-30 SDOH — HEALTH STABILITY: PHYSICAL HEALTH: ON AVERAGE, HOW MANY DAYS PER WEEK DO YOU ENGAGE IN MODERATE TO STRENUOUS EXERCISE (LIKE A BRISK WALK)?: 4 DAYS

## 2024-05-01 ENCOUNTER — OFFICE VISIT (OUTPATIENT)
Age: 69
End: 2024-05-01
Payer: MEDICARE

## 2024-05-01 VITALS
BODY MASS INDEX: 24.11 KG/M2 | RESPIRATION RATE: 14 BRPM | SYSTOLIC BLOOD PRESSURE: 154 MMHG | DIASTOLIC BLOOD PRESSURE: 87 MMHG | OXYGEN SATURATION: 96 % | HEART RATE: 55 BPM | WEIGHT: 178 LBS | HEIGHT: 72 IN

## 2024-05-01 DIAGNOSIS — E78.5 HYPERLIPIDEMIA, UNSPECIFIED HYPERLIPIDEMIA TYPE: Primary | ICD-10-CM

## 2024-05-01 DIAGNOSIS — Z86.73 HISTORY OF TIA (TRANSIENT ISCHEMIC ATTACK): ICD-10-CM

## 2024-05-01 DIAGNOSIS — R03.0 ELEVATED BLOOD PRESSURE READING IN OFFICE WITH WHITE COAT SYNDROME, WITHOUT DIAGNOSIS OF HYPERTENSION: ICD-10-CM

## 2024-05-01 PROBLEM — M17.12 PRIMARY LOCALIZED OSTEOARTHRITIS OF LEFT KNEE: Status: RESOLVED | Noted: 2023-05-18 | Resolved: 2024-05-01

## 2024-05-01 PROBLEM — C61 PROSTATE CANCER METASTATIC TO MULTIPLE SITES (HCC): Status: ACTIVE | Noted: 2021-08-04

## 2024-05-01 PROBLEM — M17.12 PRIMARY OSTEOARTHRITIS OF LEFT KNEE: Status: RESOLVED | Noted: 2023-05-18 | Resolved: 2024-05-01

## 2024-05-01 PROCEDURE — G8420 CALC BMI NORM PARAMETERS: HCPCS | Performed by: INTERNAL MEDICINE

## 2024-05-01 PROCEDURE — 99204 OFFICE O/P NEW MOD 45 MIN: CPT | Performed by: INTERNAL MEDICINE

## 2024-05-01 PROCEDURE — 3017F COLORECTAL CA SCREEN DOC REV: CPT | Performed by: INTERNAL MEDICINE

## 2024-05-01 PROCEDURE — 1123F ACP DISCUSS/DSCN MKR DOCD: CPT | Performed by: INTERNAL MEDICINE

## 2024-05-01 PROCEDURE — 1036F TOBACCO NON-USER: CPT | Performed by: INTERNAL MEDICINE

## 2024-05-01 PROCEDURE — G8427 DOCREV CUR MEDS BY ELIG CLIN: HCPCS | Performed by: INTERNAL MEDICINE

## 2024-05-01 RX ORDER — ASPIRIN 81 MG/1
81 TABLET ORAL DAILY
COMMUNITY

## 2024-05-01 SDOH — ECONOMIC STABILITY: FOOD INSECURITY: WITHIN THE PAST 12 MONTHS, YOU WORRIED THAT YOUR FOOD WOULD RUN OUT BEFORE YOU GOT MONEY TO BUY MORE.: NEVER TRUE

## 2024-05-01 SDOH — ECONOMIC STABILITY: HOUSING INSECURITY
IN THE LAST 12 MONTHS, WAS THERE A TIME WHEN YOU DID NOT HAVE A STEADY PLACE TO SLEEP OR SLEPT IN A SHELTER (INCLUDING NOW)?: NO

## 2024-05-01 SDOH — ECONOMIC STABILITY: INCOME INSECURITY: HOW HARD IS IT FOR YOU TO PAY FOR THE VERY BASICS LIKE FOOD, HOUSING, MEDICAL CARE, AND HEATING?: NOT HARD AT ALL

## 2024-05-01 SDOH — ECONOMIC STABILITY: FOOD INSECURITY: WITHIN THE PAST 12 MONTHS, THE FOOD YOU BOUGHT JUST DIDN'T LAST AND YOU DIDN'T HAVE MONEY TO GET MORE.: NEVER TRUE

## 2024-05-01 ASSESSMENT — PATIENT HEALTH QUESTIONNAIRE - PHQ9
SUM OF ALL RESPONSES TO PHQ9 QUESTIONS 1 & 2: 0
SUM OF ALL RESPONSES TO PHQ QUESTIONS 1-9: 0
1. LITTLE INTEREST OR PLEASURE IN DOING THINGS: NOT AT ALL
2. FEELING DOWN, DEPRESSED OR HOPELESS: NOT AT ALL
SUM OF ALL RESPONSES TO PHQ QUESTIONS 1-9: 0

## 2024-05-01 NOTE — PATIENT INSTRUCTIONS
Check your blood pressure at different times of the day.  When checking your blood pressure at home: you should sit for five minutes with your feet on the floor and your back supported and an empty bladder, prior to checking your blood pressure.   Check your blood pressure at different times of the day and write down the readings.     Bring or send the readings to me after a couple weeks.  The goal is to be under 130/80 at home.    If you need a new blood pressure cuff, you can search online--www.validatebp.org-- to find a blood pressure cuff model which is validated as accurate. You may then order that specific model.

## 2024-05-01 NOTE — PROGRESS NOTES
rales.   Musculoskeletal:      Right lower leg: No edema.      Left lower leg: No edema.   Neurological:      Mental Status: He is alert.                  An electronic signature was used to authenticate this note.    --Iliana Lam MD

## 2024-07-11 RX ORDER — ATORVASTATIN CALCIUM 20 MG/1
20 TABLET, FILM COATED ORAL
Qty: 90 TABLET | Refills: 4 | Status: SHIPPED | OUTPATIENT
Start: 2024-07-11

## 2024-07-11 NOTE — TELEPHONE ENCOUNTER
PCP: Iliana Lam MD    Last appt: 5/1/2024   Future Appointments   Date Time Provider Department Center   1/2/2025  9:00 AM Iliana Lam MD [facility] BS AMB       Requested Prescriptions     Pending Prescriptions Disp Refills    atorvastatin (LIPITOR) 20 MG tablet 30 tablet      Sig: Take 1 tablet by mouth nightly     Received message from patient via [employer] for refill on Lipitor, 20mg tab, QD. Patient states that he tried to refill the medication via mychart and it would not allow him to do so. Medication in pending for provider review and approval. Rx sent to [facility] on [facility].     Laine \"Lenard\" ALYSON Marques

## 2024-10-23 RX ORDER — ATORVASTATIN CALCIUM 20 MG/1
20 TABLET, FILM COATED ORAL
Qty: 90 TABLET | Refills: 4 | OUTPATIENT
Start: 2024-10-23

## 2024-10-23 NOTE — TELEPHONE ENCOUNTER
PCP: Iliana Lam MD    Last appt: 5/1/2024   Future Appointments   Date Time Provider Department Center   1/2/2025  9:00 AM Iliana Lam MD [facility] E[facility] DEP       Requested refill on atorvastatin 20mg. Last ordered 20mg with 90 tabs and 4 refills ordered. Refill requested too soon. Refill requested cancelled.    Laine \"Curlew\" ALYSON Marques

## 2024-10-24 NOTE — TELEPHONE ENCOUNTER
Patient requested refill request on Lipitor. He was informed that he still had 4 remaining refills on prescription. Information sent via [employer]. Patient told to let me know if he has any further questions.    Laine \"Lenard\" ALYSON Marques

## 2024-11-15 ENCOUNTER — HOSPITAL ENCOUNTER (EMERGENCY)
Facility: HOSPITAL | Age: 69
Discharge: HOME | End: 2024-11-15
Attending: EMERGENCY MEDICINE
Payer: MEDICARE

## 2024-11-15 VITALS
OXYGEN SATURATION: 98 % | BODY MASS INDEX: 23.61 KG/M2 | WEIGHT: 178.13 LBS | HEIGHT: 73 IN | RESPIRATION RATE: 18 BRPM | DIASTOLIC BLOOD PRESSURE: 75 MMHG | TEMPERATURE: 98.1 F | SYSTOLIC BLOOD PRESSURE: 138 MMHG | HEART RATE: 61 BPM

## 2024-11-15 DIAGNOSIS — S81.812A LACERATION OF LEFT LOWER EXTREMITY, INITIAL ENCOUNTER: Primary | ICD-10-CM

## 2024-11-15 PROCEDURE — 6360000002 HC RX W HCPCS: Performed by: EMERGENCY MEDICINE

## 2024-11-15 PROCEDURE — 99284 EMERGENCY DEPT VISIT MOD MDM: CPT

## 2024-11-15 PROCEDURE — 90714 TD VACC NO PRESV 7 YRS+ IM: CPT | Performed by: EMERGENCY MEDICINE

## 2024-11-15 PROCEDURE — 90471 IMMUNIZATION ADMIN: CPT | Performed by: EMERGENCY MEDICINE

## 2024-11-15 RX ADMIN — CLOSTRIDIUM TETANI TOXOID ANTIGEN (FORMALDEHYDE INACTIVATED) AND CORYNEBACTERIUM DIPHTHERIAE TOXOID ANTIGEN (FORMALDEHYDE INACTIVATED) 0.5 ML: 5; 2 INJECTION, SUSPENSION INTRAMUSCULAR at 12:41

## 2024-11-15 NOTE — DISCHARGE INSTRUCTIONS
You were seen in the emergency department for a laceration over the leg sustained 2 days ago.  This far out from the injury, it was too late to repair it with sutures.  Please follow-up with your PCP or return to the emergency department if you experience a worsening of symptoms or any new symptoms that are concerning to you.

## 2024-11-15 NOTE — ED PROVIDER NOTES
medications on file         (Please note that portions of this note were completed with a voice recognition program.  Efforts were made to edit the dictations but occasionally words are mis-transcribed.)    Luke Murphy MD (electronically signed)  Emergency Attending Physician / Physician Assistant / Nurse Practitioner          him.    Risk  Prescription drug management.            REASSESSMENT            CONSULTS:  None    PROCEDURES:  Unless otherwise noted below, none     Procedures      FINAL IMPRESSION      1. Laceration of left lower extremity, initial encounter          DISPOSITION/PLAN   DISPOSITION Decision To Discharge 11/15/2024 12:42:51 PM      PATIENT REFERRED TO:  Iliana Lam MD  [address]  [address]  [place] [address]  [phone]    Schedule an appointment as soon as possible for a visit         DISCHARGE MEDICATIONS:  Discharge Medication List as of 11/15/2024 12:34 PM            (Please note that portions of this note were completed with a voice recognition program.  Efforts were made to edit the dictations but occasionally words are mis-transcribed.)    Luke Murphy MD (electronically signed)  Emergency Attending Physician / Physician Assistant / Nurse Practitioner            Luke Murphy MD  11/18/24 2085

## 2024-11-15 NOTE — ED TRIAGE NOTES
Patient presents to ED for a puncture wound in his L calf. Pt states his car door hit his calf on Wednesday. Pt unsure of last tetanus shot.

## 2024-11-18 ASSESSMENT — ENCOUNTER SYMPTOMS
GASTROINTESTINAL NEGATIVE: 1
EYES NEGATIVE: 1
RESPIRATORY NEGATIVE: 1

## 2024-12-26 SDOH — HEALTH STABILITY: PHYSICAL HEALTH: ON AVERAGE, HOW MANY MINUTES DO YOU ENGAGE IN EXERCISE AT THIS LEVEL?: 60 MIN

## 2024-12-26 SDOH — HEALTH STABILITY: PHYSICAL HEALTH: ON AVERAGE, HOW MANY DAYS PER WEEK DO YOU ENGAGE IN MODERATE TO STRENUOUS EXERCISE (LIKE A BRISK WALK)?: 3 DAYS

## 2024-12-26 ASSESSMENT — PATIENT HEALTH QUESTIONNAIRE - PHQ9
SUM OF ALL RESPONSES TO PHQ QUESTIONS 1-9: 0
SUM OF ALL RESPONSES TO PHQ QUESTIONS 1-9: 0
1. LITTLE INTEREST OR PLEASURE IN DOING THINGS: NOT AT ALL
SUM OF ALL RESPONSES TO PHQ QUESTIONS 1-9: 0
2. FEELING DOWN, DEPRESSED OR HOPELESS: NOT AT ALL
SUM OF ALL RESPONSES TO PHQ QUESTIONS 1-9: 0
SUM OF ALL RESPONSES TO PHQ9 QUESTIONS 1 & 2: 0

## 2024-12-26 ASSESSMENT — LIFESTYLE VARIABLES
HOW OFTEN DO YOU HAVE A DRINK CONTAINING ALCOHOL: NEVER
HOW MANY STANDARD DRINKS CONTAINING ALCOHOL DO YOU HAVE ON A TYPICAL DAY: PATIENT DOES NOT DRINK
HOW OFTEN DO YOU HAVE SIX OR MORE DRINKS ON ONE OCCASION: 1
HOW MANY STANDARD DRINKS CONTAINING ALCOHOL DO YOU HAVE ON A TYPICAL DAY: 0
HOW OFTEN DO YOU HAVE A DRINK CONTAINING ALCOHOL: 1

## 2025-01-02 ENCOUNTER — OFFICE VISIT (OUTPATIENT)
Facility: CLINIC | Age: 70
End: 2025-01-02
Payer: MEDICARE

## 2025-01-02 VITALS
HEART RATE: 52 BPM | RESPIRATION RATE: 14 BRPM | WEIGHT: 174 LBS | HEIGHT: 73 IN | BODY MASS INDEX: 23.06 KG/M2 | OXYGEN SATURATION: 97 % | DIASTOLIC BLOOD PRESSURE: 79 MMHG | SYSTOLIC BLOOD PRESSURE: 139 MMHG

## 2025-01-02 DIAGNOSIS — R03.0 ELEVATED BLOOD PRESSURE READING IN OFFICE WITH WHITE COAT SYNDROME, WITHOUT DIAGNOSIS OF HYPERTENSION: ICD-10-CM

## 2025-01-02 DIAGNOSIS — C61 PROSTATE CANCER METASTATIC TO MULTIPLE SITES (HCC): ICD-10-CM

## 2025-01-02 DIAGNOSIS — Z00.00 MEDICARE ANNUAL WELLNESS VISIT, SUBSEQUENT: Primary | ICD-10-CM

## 2025-01-02 PROBLEM — M17.11 PRIMARY OSTEOARTHRITIS OF RIGHT KNEE: Status: ACTIVE | Noted: 2025-01-02

## 2025-01-02 PROCEDURE — 99213 OFFICE O/P EST LOW 20 MIN: CPT | Performed by: INTERNAL MEDICINE

## 2025-01-02 PROCEDURE — G0008 ADMIN INFLUENZA VIRUS VAC: HCPCS | Performed by: INTERNAL MEDICINE

## 2025-01-02 NOTE — PATIENT INSTRUCTIONS
Pain, pressure, or a strange feeling in the back, neck, jaw, or upper belly or in one or both shoulders or arms.     Lightheadedness or sudden weakness.     A fast or irregular heartbeat.   After you call 911, the  may tell you to chew 1 adult-strength or 2 to 4 low-dose aspirin. Wait for an ambulance. Do not try to drive yourself.  Watch closely for changes in your health, and be sure to contact your doctor if you have any problems.  Where can you learn more?  Go to https://www.[employer].net/patientEd and enter F075 to learn more about \"A Healthy Heart: Care Instructions.\"  Current as of: June 24, 2023  Content Version: 14.2  © 2024 [employer].   Care instructions adapted under license by [employer]. If you have questions about a medical condition or this instruction, always ask your healthcare professional. [employer] disclaims any warranty or liability for your use of this information.      Personalized Preventive Plan for Darrius Spencer - 1/2/2025  Medicare offers a range of preventive health benefits. Some of the tests and screenings are paid in full while other may be subject to a deductible, co-insurance, and/or copay.  Some of these benefits include a comprehensive review of your medical history including lifestyle, illnesses that may run in your family, and various assessments and screenings as appropriate.  After reviewing your medical record and screening and assessments performed today your provider may have ordered immunizations, labs, imaging, and/or referrals for you.  A list of these orders (if applicable) as well as your Preventive Care list are included within your After Visit Summary for your review.

## 2025-01-02 NOTE — PROGRESS NOTES
Medicare Annual Wellness Visit    Darrius Spencer is here for Medicare AWV    Assessment & Plan       1. Medicare annual wellness visit, subsequent  He has an advanced medical directive on file. Most immunizations are up to date; flu vaccine administered today.  Shingrix, COVID recommended. Colonoscopy is up to date. Continue to follow up here every six months, at least, and with specialists as directed.  Referred to audiology today.    2. Prostate cancer metastatic to multiple sites (HCC)  Stable, on Zytiga and leuprolide, followed by oncology.    3. Elevated blood pressure reading in office with white coat syndrome, without diagnosis of hypertension  Improved blood pressure on exam today.  Most blood pressures at home are in the 110s over 60s to low 130s over 70s.  Continue monitoring and continue regular exercise.          Return in about 6 months (around 7/2/2025) for cholesterol.     Subjective       Darrius Spencer is here for a wellness visit.    Health maintenance: He has been seen by the following providers in the last year: oncology, dermatology, orthopedics. He is exercising 3 days/week for 60 minutes.  Last colon cancer screening was colonoscopy in November 2021. Denies any significant decrease in hearing, but does have some. Has had no falls in the last year. Mr. Spencer is independent with ADLs, and iADLs, and feels safe at home.    He is feeling well, overall. Has joined the gym, has been improving his diet. At home, his blood pressure has been (better after exercise).      Patient's complete Health Risk Assessment and screening values have been reviewed and are found in Flowsheets. The following problems were reviewed today and where indicated follow up appointments were made and/or referrals ordered.    Positive Risk Factor Screenings with Interventions:        Drug Use:   Substance and Sexual Activity   Drug Use Yes    Types: Marijuana (Weed)    Comment: smoking or gummie -- once every few

## 2025-06-27 PROBLEM — E78.49 OTHER HYPERLIPIDEMIA: Status: ACTIVE | Noted: 2024-05-01

## 2025-07-02 ENCOUNTER — OFFICE VISIT (OUTPATIENT)
Facility: CLINIC | Age: 70
End: 2025-07-02
Payer: MEDICARE

## 2025-07-02 VITALS
HEIGHT: 73 IN | DIASTOLIC BLOOD PRESSURE: 88 MMHG | OXYGEN SATURATION: 97 % | HEART RATE: 55 BPM | BODY MASS INDEX: 21.98 KG/M2 | SYSTOLIC BLOOD PRESSURE: 140 MMHG | WEIGHT: 165.8 LBS

## 2025-07-02 DIAGNOSIS — E16.2 HYPOGLYCEMIA: ICD-10-CM

## 2025-07-02 DIAGNOSIS — E78.5 HYPERLIPIDEMIA, UNSPECIFIED HYPERLIPIDEMIA TYPE: Primary | ICD-10-CM

## 2025-07-02 DIAGNOSIS — R03.0 ELEVATED BLOOD PRESSURE READING IN OFFICE WITH WHITE COAT SYNDROME, WITHOUT DIAGNOSIS OF HYPERTENSION: ICD-10-CM

## 2025-07-02 DIAGNOSIS — E78.5 HYPERLIPIDEMIA, UNSPECIFIED HYPERLIPIDEMIA TYPE: ICD-10-CM

## 2025-07-02 PROCEDURE — 1036F TOBACCO NON-USER: CPT | Performed by: INTERNAL MEDICINE

## 2025-07-02 PROCEDURE — 1159F MED LIST DOCD IN RCRD: CPT | Performed by: INTERNAL MEDICINE

## 2025-07-02 PROCEDURE — 1160F RVW MEDS BY RX/DR IN RCRD: CPT | Performed by: INTERNAL MEDICINE

## 2025-07-02 PROCEDURE — 1123F ACP DISCUSS/DSCN MKR DOCD: CPT | Performed by: INTERNAL MEDICINE

## 2025-07-02 PROCEDURE — 1126F AMNT PAIN NOTED NONE PRSNT: CPT | Performed by: INTERNAL MEDICINE

## 2025-07-02 PROCEDURE — 99214 OFFICE O/P EST MOD 30 MIN: CPT | Performed by: INTERNAL MEDICINE

## 2025-07-02 PROCEDURE — G8420 CALC BMI NORM PARAMETERS: HCPCS | Performed by: INTERNAL MEDICINE

## 2025-07-02 PROCEDURE — G8427 DOCREV CUR MEDS BY ELIG CLIN: HCPCS | Performed by: INTERNAL MEDICINE

## 2025-07-02 PROCEDURE — 3017F COLORECTAL CA SCREEN DOC REV: CPT | Performed by: INTERNAL MEDICINE

## 2025-07-02 SDOH — ECONOMIC STABILITY: FOOD INSECURITY: WITHIN THE PAST 12 MONTHS, YOU WORRIED THAT YOUR FOOD WOULD RUN OUT BEFORE YOU GOT MONEY TO BUY MORE.: NEVER TRUE

## 2025-07-02 SDOH — ECONOMIC STABILITY: FOOD INSECURITY: WITHIN THE PAST 12 MONTHS, THE FOOD YOU BOUGHT JUST DIDN'T LAST AND YOU DIDN'T HAVE MONEY TO GET MORE.: NEVER TRUE

## 2025-07-02 ASSESSMENT — PATIENT HEALTH QUESTIONNAIRE - PHQ9
SUM OF ALL RESPONSES TO PHQ QUESTIONS 1-9: 0
SUM OF ALL RESPONSES TO PHQ QUESTIONS 1-9: 0
1. LITTLE INTEREST OR PLEASURE IN DOING THINGS: NOT AT ALL
2. FEELING DOWN, DEPRESSED OR HOPELESS: NOT AT ALL
SUM OF ALL RESPONSES TO PHQ QUESTIONS 1-9: 0
SUM OF ALL RESPONSES TO PHQ QUESTIONS 1-9: 0

## 2025-07-02 NOTE — PROGRESS NOTES
A/P:      ICD-10-CM    1. Hyperlipidemia, unspecified hyperlipidemia type  E78.5 Lipid Panel      2. Hypoglycemia  E16.2 Basic Metabolic Panel      3. Elevated blood pressure reading in office with white coat syndrome, without diagnosis of hypertension  R03.0              Assessment & Plan  1. Hypercholesterolemia: Likely stable. LDL 68 on 2024.   - Discussed potential discontinuation of medication to assess impact on cholesterol levels, as he is considering stopping or lowering his dose.  - Explained benefits of statins beyond cholesterol reduction, including plaque prevention. He will consider the medication change for now, and let me know what he decides.  - Continue atorvastatin 20 mg daily.  - Lipid panel ordered today.    2. Elevated blood pressure.  - Monitor blood pressure at home and bring readings to next appointment.  - Check blood pressure on the day of the appointment.    3. Hypoglycemia: noted on outside labs in May. Asymptomatic.    - Metabolic panel ordered today to monitor blood sugar levels when fasting today.    Follow-up  - Wellness visit scheduled for 6 months from now.               An electronic signature was used to authenticate this note.    --Iliana Lam MD         HPI: Darrius Spencer is here for a follow up visit:      History of Present Illness  The patient is a 69-year-old male here for follow-up on cholesterol and blood pressure.    He has been monitoring his blood pressure at home, which typically registers lower than the readings taken during his doctor's visits. However, he has not checked his blood pressure in the past 4 to 5 months. He used to record his blood pressure every other week but has not done so recently.    He has brought along some of his recent blood test results and has a query regarding one of them. He also has his cholesterol readings from 2024, which have remained consistent. He is currently on atorvastatin 20 mg and is considering discontinuing it to see

## 2025-07-03 LAB
ANION GAP SERPL CALC-SCNC: 4 MMOL/L (ref 2–12)
BUN SERPL-MCNC: 16 MG/DL (ref 6–20)
BUN/CREAT SERPL: 20 (ref 12–20)
CALCIUM SERPL-MCNC: 9 MG/DL (ref 8.5–10.1)
CHLORIDE SERPL-SCNC: 107 MMOL/L (ref 97–108)
CHOLEST SERPL-MCNC: 146 MG/DL
CO2 SERPL-SCNC: 29 MMOL/L (ref 21–32)
CREAT SERPL-MCNC: 0.81 MG/DL (ref 0.7–1.3)
GLUCOSE SERPL-MCNC: 87 MG/DL (ref 65–100)
HDLC SERPL-MCNC: 80 MG/DL
HDLC SERPL: 1.8 (ref 0–5)
LDLC SERPL CALC-MCNC: 51.4 MG/DL (ref 0–100)
POTASSIUM SERPL-SCNC: 3.9 MMOL/L (ref 3.5–5.1)
SODIUM SERPL-SCNC: 140 MMOL/L (ref 136–145)
TRIGL SERPL-MCNC: 73 MG/DL
VLDLC SERPL CALC-MCNC: 14.6 MG/DL

## 2025-07-05 ENCOUNTER — RESULTS FOLLOW-UP (OUTPATIENT)
Facility: CLINIC | Age: 70
End: 2025-07-05

## 2025-08-07 RX ORDER — ATORVASTATIN CALCIUM 20 MG/1
20 TABLET, FILM COATED ORAL NIGHTLY
Qty: 90 TABLET | Refills: 4 | Status: SHIPPED | OUTPATIENT
Start: 2025-08-07

## 2025-08-13 DIAGNOSIS — E78.49 OTHER HYPERLIPIDEMIA: Primary | ICD-10-CM

## 2025-08-13 RX ORDER — ATORVASTATIN CALCIUM 20 MG/1
20 TABLET, FILM COATED ORAL NIGHTLY
Qty: 90 TABLET | Refills: 4 | Status: CANCELLED | OUTPATIENT
Start: 2025-08-13

## 2025-08-15 RX ORDER — ATORVASTATIN CALCIUM 20 MG/1
20 TABLET, FILM COATED ORAL NIGHTLY
Qty: 90 TABLET | Refills: 4 | Status: SHIPPED | OUTPATIENT
Start: 2025-08-15

## 2025-08-15 RX ORDER — ATORVASTATIN CALCIUM 20 MG/1
20 TABLET, FILM COATED ORAL NIGHTLY
Qty: 90 TABLET | Refills: 4 | OUTPATIENT
Start: 2025-08-15

## (undated) DEVICE — ELECTRODE,RADIOTRANSLUCENT,FOAM,3PK: Brand: MEDLINE

## (undated) DEVICE — KIT INT FIX FEM TIB CKPT MAKOPLASTY

## (undated) DEVICE — STRYKER PERFORMANCE SERIES SAGITTAL BLADE: Brand: STRYKER PERFORMANCE SERIES

## (undated) DEVICE — BAG BELONG PT PERS CLEAR HANDL

## (undated) DEVICE — GLOVE SURG SZ 9 L12IN FNGR THK79MIL GRN LTX FREE

## (undated) DEVICE — SUTURE VCRL + SZ 1-0 L36IN ABSRB UD CTX 1/2 CIR TAPR PNT VCP977H

## (undated) DEVICE — TOTAL JOINT-SFMC: Brand: MEDLINE INDUSTRIES, INC.

## (undated) DEVICE — SIMPLICITY FLUFF UNDERPAD 23X36, MODERATE: Brand: SIMPLICITY

## (undated) DEVICE — ELECTRODE BLDE L4IN NONINSULATED EDGE

## (undated) DEVICE — Device

## (undated) DEVICE — BAG SPEC BIOHZRD 10 X 10 IN --

## (undated) DEVICE — SET ADMIN 16ML TBNG L100IN 2 Y INJ SITE IV PIGGY BK DISP

## (undated) DEVICE — Device: Brand: JELCO

## (undated) DEVICE — CUFF RMFG BP INF SZ 11 DISP -- LAWSON OEM ITEM 238915

## (undated) DEVICE — CATH IV AUTOGRD BC BLU 22GA 25 -- INSYTE

## (undated) DEVICE — DUAL IRRIGATION ADAPTOR

## (undated) DEVICE — CONTAINER SPEC 20 ML LID NEUT BUFF FORMALIN 10 % POLYPR STS

## (undated) DEVICE — 1200 GUARD II KIT W/5MM TUBE W/O VAC TUBE: Brand: GUARDIAN

## (undated) DEVICE — GLOVE SURG SZ 85 L12IN FNGR ORTHO 126MIL CRM LTX FREE

## (undated) DEVICE — 4-PORT MANIFOLD: Brand: NEPTUNE 2

## (undated) DEVICE — KIT COLON W/ 1.1OZ LUB AND 2 END

## (undated) DEVICE — KIT DRP FOR RIO ROBOTIC ARM ASST SYS

## (undated) DEVICE — SUTURE MCRYL SZ 3-0 L27IN ABSRB UD L24MM PS-1 3/8 CIR PRIM Y936H

## (undated) DEVICE — SUTURE VCRL SZ 2-0 L36IN ABSRB UD L36MM CT-1 1/2 CIR J945H

## (undated) DEVICE — SYRINGE MED 30ML STD CLR PLAS LUERLOCK TIP N CTRL DISP

## (undated) DEVICE — (D)SENSOR RMFG 02 PULS OXMTR -- DISC BY MFR USE ITEM 133445

## (undated) DEVICE — PIN BONE 3.2 X 140MM

## (undated) DEVICE — HOOD: Brand: FLYTE

## (undated) DEVICE — SOLIDIFIER MEDC 1200ML -- CONVERT TO 356117

## (undated) DEVICE — 3M™ CUROS™ DISINFECTING CAP FOR NEEDLELESS CONNECTORS 270/CARTON 20 CARTONS/CASE CFF1-270: Brand: CUROS™

## (undated) DEVICE — PIN BNE FIX L110MM DIA32MM

## (undated) DEVICE — 1010 S-DRAPE TOWEL DRAPE 10/BX: Brand: STERI-DRAPE™

## (undated) DEVICE — POLYP TRAP: Brand: TRAPEASE®

## (undated) DEVICE — TAPE,CLOTH/SILK,CURAD,3"X10YD,LF,40/CS: Brand: CURAD

## (undated) DEVICE — SUTURE ABSORBABLE BRAIDED 2-0 CT-1 27 IN UD VICRYL J259H

## (undated) DEVICE — KIT TRK KNEE PROC VIZADISC

## (undated) DEVICE — PENCIL SMK EVAC ALL IN 1 DSGN ENH VISIBILITY IMPROVED AIR

## (undated) DEVICE — SOLUTION IRRIG 3000ML 0.9% SOD CHL USP UROMATIC PLAS CONT

## (undated) DEVICE — DRAPE,EXTREMITY,89X128,STERILE: Brand: MEDLINE

## (undated) DEVICE — COVER,MAYO STAND,STERILE: Brand: MEDLINE

## (undated) DEVICE — GLOVE SURG SZ 85 L12IN FNGR THK79MIL GRN LTX FREE

## (undated) DEVICE — CATH IV AUTOGRD BC PNK 20GA 25 -- INSYTE

## (undated) DEVICE — SYR 50ML SLIP TIP NSAF LF STRL --

## (undated) DEVICE — BLUNTFILL: Brand: MONOJECT

## (undated) DEVICE — PREP KIT PEEL PTCH POVIDONE IOD

## (undated) DEVICE — RUBBERBAND FASTENING W0.25XL3.5IN 5 PER PK

## (undated) DEVICE — SUTURE STRATAFIX SYMMETRIC SZ 1 L18IN ABSRB VLT CT1 L36CM SXPP1A404

## (undated) DEVICE — SNARE ENDOSCP M L240CM W27MM SHTH DIA2.4MM CHN 2.8MM OVL

## (undated) DEVICE — BLADE SURG SAW STD S STL OSC W/ SERR EDGE DISP

## (undated) DEVICE — SPONGE GZ W4XL4IN COT 12 PLY TYP VII WVN C FLD DSGN STERILE

## (undated) DEVICE — DRESSING ANTIMIC FOAM OPTIFOAM POSTOP ADH 4X14 IN

## (undated) DEVICE — BANDAGE COMPR W6INXL10YD ST M E WHITE/BEIGE

## (undated) DEVICE — CANN NASAL O2 CAPNOGRAPHY AD -- FILTERLINE